# Patient Record
Sex: FEMALE | Race: WHITE | NOT HISPANIC OR LATINO | Employment: FULL TIME | URBAN - METROPOLITAN AREA
[De-identification: names, ages, dates, MRNs, and addresses within clinical notes are randomized per-mention and may not be internally consistent; named-entity substitution may affect disease eponyms.]

---

## 2017-01-31 ENCOUNTER — GENERIC CONVERSION - ENCOUNTER (OUTPATIENT)
Dept: OTHER | Facility: OTHER | Age: 32
End: 2017-01-31

## 2017-11-20 ENCOUNTER — ALLSCRIPTS OFFICE VISIT (OUTPATIENT)
Dept: OTHER | Facility: OTHER | Age: 32
End: 2017-11-20

## 2017-11-20 DIAGNOSIS — Z13.6 ENCOUNTER FOR SCREENING FOR CARDIOVASCULAR DISORDERS: ICD-10-CM

## 2017-11-20 DIAGNOSIS — Z00.00 ENCOUNTER FOR GENERAL ADULT MEDICAL EXAMINATION WITHOUT ABNORMAL FINDINGS: ICD-10-CM

## 2017-11-20 DIAGNOSIS — H92.01 OTALGIA OF RIGHT EAR: ICD-10-CM

## 2017-11-22 NOTE — PROGRESS NOTES
Assessment  1  Encounter for preventive health examination (V70 0) (Z00 00)    Plan  Ear pain, right, Health Maintenance    · (1) CBC/PLT/DIFF; Status:Active; Requested for:20Nov2017;   Screening for cardiovascular condition    · (1) COMPREHENSIVE METABOLIC PANEL; Status:Active; Requested for:20Nov2017;    · (1) LIPID PANEL, FASTING; Status:Active; Requested for:20Nov2017;     Discussion/Summary    Will check screening labs  Continue exercising  Improve diet  Follow up in one year or sooner pending labs  The patient was counseled regarding diagnostic results,-- instructions for management,-- risk factor reductions,-- impressions,-- risks and benefits of treatment options,-- importance of compliance with treatment  Possible side effects of new medications were reviewed with the patient/guardian today  The treatment plan was reviewed with the patient/guardian  The patient/guardian understands and agrees with the treatment plan     Self Referrals: No      Chief Complaint    1  Cold Symptoms   2  Ear Pain  Patient is here with complaints of ear pain  She did have a sore throat, but that has gone away  History of Present Illness  HPI: Follow-upmade this appointment last week when she was having some right ear pain  This has since improved  However she notes she needed to be seen again since she has not been seen since 2015  At this time she is not having any focal issues  did do a pH Q9  Which by numbers is positive however she justified all her answers in that she works in night shift, does not feel she always gets proper rest  This is why she is tired this is also why she answered positive answers to moving slowly, feeling bad that she is not always [employer] Sheets in helping her family and occasionally has little energy  She denies being depressed, no suicidal thoughts, does not feel she needs any medication  has been reviewed, clarified, and updated        Review of Systems   Constitutional: no fever,-- not feeling poorly,-- no chills-- and-- not feeling tired  ENT: earache, but-- as noted in HPI,-- no sore throat-- and-- no nasal discharge  Cardiovascular: no complaints of slow or fast heart rate, no chest pain, no palpitations, no leg claudication or lower extremity edema  Respiratory: no complaints of shortness of breath, no wheezing, no dyspnea on exertion, no orthopnea or PND  Gastrointestinal: no complaints of abdominal pain, no constipation, no nausea or diarrhea, no vomiting, no bloody stools  Genitourinary: no dysuria-- and-- no incontinence  Musculoskeletal: no complaints of arthralgia, no myalgia, no joint swelling or stiffness, no limb pain or swelling  Integumentary: no rashes  Neurological: no complaints of headache, no confusion, no numbness or tingling, no dizziness or fainting  Active Problems  1  Screening for cardiovascular condition (V81 2) (Z13 6)   2  Screening for diabetes mellitus (V77 1) (Z13 1)    Past Medical History  Active Problems And Past Medical History Reviewed: The active problems and past medical history were reviewed and updated today  Surgical History  Surgical History Reviewed: The surgical history was reviewed and updated today  Social History   · Never a smoker   · Occasional alcohol use  The social history was reviewed and updated today  The social history was reviewed and is unchanged  Family History  Family History Reviewed: The family history was reviewed and updated today  Current Meds   1  No Reported Medications Recorded    The medication list was reviewed and updated today  Allergies  1  No Known Drug Allergies    Vitals   Recorded: 20Nov2017 02:58PM   Temperature 97 6 F   Heart Rate 76   Systolic 399   Diastolic 80   Height 5 ft 5 in   Weight 214 lb 6 oz   BMI Calculated 35 67   BSA Calculated 2 04   O2 Saturation 95       Physical Exam   Constitutional  General appearance: No acute distress, well appearing and well nourished     Eyes Conjunctiva and lids: No swelling, erythema or discharge  Pupils and irises: Equal, round and reactive to light  Ears, Nose, Mouth, and Throat  External inspection of ears and nose: Normal    Otoscopic examination: Tympanic membranes translucent with normal light reflex  Canals patent without erythema  Nasal mucosa, septum, and turbinates: Normal without edema or erythema  Oropharynx: Normal with no erythema, edema, exudate or lesions  Pulmonary  Respiratory effort: No increased work of breathing or signs of respiratory distress  Auscultation of lungs: Clear to auscultation  Cardiovascular  Auscultation of heart: Normal rate and rhythm, normal S1 and S2, without murmurs  Examination of extremities for edema and/or varicosities: Normal    Carotid pulses: Normal    Abdomen  Abdomen: Non-tender, no masses  Liver and spleen: No hepatomegaly or splenomegaly  Lymphatic  Palpation of lymph nodes in neck: No lymphadenopathy  Musculoskeletal  Gait and station: Normal    Digits and nails: Normal without clubbing or cyanosis  Inspection/palpation of joints, bones, and muscles: Normal    Skin  Skin and subcutaneous tissue: Normal without rashes or lesions  Neurologic  Cranial nerves: Cranial nerves 2-12 intact  Reflexes: 2+ and symmetric  Sensation: No sensory loss     Psychiatric  Orientation to person, place, and time: Normal    Mood and affect: Normal          Signatures   Electronically signed by : Nazia Tejeda, Vishnu Fernandes; Nov 20 2017  3:26PM EST                       (Author)    Electronically signed by : Manda Watson MD; Nov 21 2017  8:19AM EST

## 2018-01-14 VITALS
HEIGHT: 65 IN | BODY MASS INDEX: 35.72 KG/M2 | DIASTOLIC BLOOD PRESSURE: 80 MMHG | OXYGEN SATURATION: 95 % | WEIGHT: 214.38 LBS | TEMPERATURE: 97.6 F | HEART RATE: 76 BPM | SYSTOLIC BLOOD PRESSURE: 118 MMHG

## 2019-09-04 ENCOUNTER — OFFICE VISIT (OUTPATIENT)
Dept: INTERNAL MEDICINE CLINIC | Facility: CLINIC | Age: 34
End: 2019-09-04
Payer: COMMERCIAL

## 2019-09-04 VITALS
BODY MASS INDEX: 34.23 KG/M2 | SYSTOLIC BLOOD PRESSURE: 112 MMHG | HEART RATE: 60 BPM | WEIGHT: 213 LBS | OXYGEN SATURATION: 98 % | RESPIRATION RATE: 20 BRPM | DIASTOLIC BLOOD PRESSURE: 76 MMHG | HEIGHT: 66 IN

## 2019-09-04 DIAGNOSIS — G56.22 ULNAR NEUROPATHY AT ELBOW, LEFT: ICD-10-CM

## 2019-09-04 DIAGNOSIS — M79.10 MYALGIA: ICD-10-CM

## 2019-09-04 DIAGNOSIS — Z00.00 ANNUAL PHYSICAL EXAM: Primary | ICD-10-CM

## 2019-09-04 DIAGNOSIS — Z13.6 SCREENING FOR HEART DISEASE: ICD-10-CM

## 2019-09-04 DIAGNOSIS — L98.9 SKIN LESIONS: ICD-10-CM

## 2019-09-04 PROCEDURE — 99395 PREV VISIT EST AGE 18-39: CPT | Performed by: PHYSICIAN ASSISTANT

## 2019-09-04 NOTE — PROGRESS NOTES
Assessment/Plan:   Patient Instructions   Patient's general medical exam is good  Will augment exam by doing screening labs  We will contact patient with results when they are available  Will also send to Dermatology due to recurrent skin lesions  Patient should find out the diagnosis of her mother's skin condition  Also patient can contact the [facility] travel clinic to find out if she needs any immunizations for her trip to [facility] in February  Recommend gyn follow-up  BMI Counseling: Body mass index is 34 38 kg/m²  Discussed the patient's BMI with her  The BMI is above average  BMI counseling and education was provided to the patient  Nutrition recommendations include reducing portion sizes, decreasing overall calorie intake and moderation in carbohydrate intake  Exercise recommendations include exercising 3-5 times per week  Return in about 1 year (around 9/4/2020) for Annual physical          Diagnoses and all orders for this visit:    Annual physical exam    Myalgia  -     Vitamin D 25 hydroxy; Future    Skin lesions  -     Ambulatory referral to Dermatology; Future  -     CBC and differential; Future    Screening for heart disease  -     Comprehensive metabolic panel; Future  -     Lipid panel; Future          Subjective:      Patient ID: Es Aguilar is a 34 y o  female  81-year-old white female presents for general medical exam   She has 2 concerns  One is that she has recurrent dry scaly patches at compound on her skin and after a while seem to disappear  This is concerning because her mother started many years ago with similar symptoms, was diagnosed with psoriasis, however her skin condition never responded to any of the medications  According to the patient she was recently seen by Dermatology and a different diagnosis was made, she is not aware of the diagnosis, however her current treatment has cleared her completely    She also will be traveling to [facility] in February and is questioning whether not she will need any immunizations  She was directed to [facility] travel clinic to research this  Patient also describes left ulnar nerve symptoms that come and go  At this current time she is asymptomatic  She was asked to keep us informed if the condition starts becoming more regular  EMR has been reviewed, clarified, and updated with patient today  ALLERGIES:  Allergies   Allergen Reactions    Shellfish-Derived Products        CURRENT MEDICATIONS:  No current outpatient medications on file  ACTIVE PROBLEM LIST:  There is no problem list on file for this patient        PAST MEDICAL HISTORY:  Past Medical History:   Diagnosis Date    Known health problems: none        PAST SURGICAL HISTORY:  Past Surgical History:   Procedure Laterality Date    TOOTH EXTRACTION         FAMILY HISTORY:  Family History   Problem Relation Age of Onset    Diabetes Mother     Thyroid disease Mother     Diabetes Father     Venous thrombosis Maternal Grandfather        SOCIAL HISTORY:  Social History     Socioeconomic History    Marital status: /Civil Union     Spouse name: Not on file    Number of children: Not on file    Years of education: Not on file    Highest education level: Not on file   Occupational History    Not on file   Social Needs    Financial resource strain: Not on file    Food insecurity:     Worry: Not on file     Inability: Not on file    Transportation needs:     Medical: Not on file     Non-medical: Not on file   Tobacco Use    Smoking status: Never Smoker    Smokeless tobacco: Never Used   Substance and Sexual Activity    Alcohol use: Yes     Comment: Angela Foster Drug use: Never    Sexual activity: Yes     Partners: Male   Lifestyle    Physical activity:     Days per week: Not on file     Minutes per session: Not on file    Stress: Not on file   Relationships    Social connections:     Talks on phone: Not on file     Gets together: Not on file Attends Ca[employer] service: Not on file     Active member of club or organization: Not on file     Attends meetings of clubs or organizations: Not on file     Relationship status: Not on file    Intimate partner violence:     Fear of current or ex partner: Not on file     Emotionally abused: Not on file     Physically abused: Not on file     Forced sexual activity: Not on file   Other Topics Concern    Not on file   Social History Narrative        1 child    Working-[employer] nights    Hobbies-drawing    Exercise-running    Reg dental care, brush twice daily       Review of Systems   Constitutional: Negative for activity change, chills, fatigue and fever  HENT: Negative for congestion  Eyes: Negative for discharge and visual disturbance  Respiratory: Negative for cough, chest tightness and shortness of breath  Cardiovascular: Negative for chest pain, palpitations and leg swelling  Gastrointestinal: Negative for abdominal pain, blood in stool, constipation, diarrhea, nausea and vomiting  Endocrine: Negative for polydipsia, polyphagia and polyuria  Genitourinary: Negative for difficulty urinating  Musculoskeletal: Negative for arthralgias and myalgias  Skin: Negative for rash  Allergic/Immunologic: Negative for immunocompromised state  Neurological: Negative for dizziness, syncope, weakness, light-headedness and headaches  Hematological: Negative for adenopathy  Does not bruise/bleed easily  Psychiatric/Behavioral: Negative for dysphoric mood, sleep disturbance and suicidal ideas  The patient is not nervous/anxious  Objective:  Vitals:    09/04/19 1205   BP: 112/76   BP Location: Left arm   Patient Position: Sitting   Cuff Size: Large   Pulse: 60   Resp: 20   SpO2: 98%   Weight: 96 6 kg (213 lb)   Height: 5' 6" (1 676 m)     Body mass index is 34 38 kg/m²  Physical Exam   Constitutional: She is oriented to person, place, and time   She appears well-developed and well-nourished  No distress  Obese   HENT:   Head: Normocephalic  Right Ear: External ear normal    Left Ear: External ear normal    Nose: Nose normal    Mouth/Throat: Oropharynx is clear and moist  No oropharyngeal exudate  Eyes: Pupils are equal, round, and reactive to light  Conjunctivae and EOM are normal  Right eye exhibits no discharge  Left eye exhibits no discharge  No scleral icterus  Neck: Normal range of motion  Neck supple  No JVD present  Carotid bruit is not present  No tracheal deviation present  No thyromegaly present  Cardiovascular: Normal rate, regular rhythm, normal heart sounds and intact distal pulses  Exam reveals no gallop and no friction rub  No murmur heard  Pulmonary/Chest: Effort normal and breath sounds normal  No respiratory distress  She has no wheezes  She has no rales  She exhibits no tenderness  Abdominal: Soft  Bowel sounds are normal  She exhibits no distension and no mass  There is no hepatosplenomegaly  There is no tenderness  There is no rebound, no guarding and no CVA tenderness  Obese   Musculoskeletal: Normal range of motion  She exhibits no edema, tenderness or deformity  Lymphadenopathy:     She has no cervical adenopathy  Neurological: She is alert and oriented to person, place, and time  She has normal reflexes  She displays normal reflexes  No cranial nerve deficit or sensory deficit  She exhibits normal muscle tone  Coordination normal    Skin: Skin is warm and dry  No rash noted  Psychiatric: She has a normal mood and affect  Her behavior is normal  Judgment and thought content normal    Nursing note and vitals reviewed  RESULTS:    No results found for this or any previous visit (from the past 1008 hour(s))  This note was created with voice recognition software  Phonic, grammatical and spelling errors may be present within the note as a result

## 2019-09-04 NOTE — PATIENT INSTRUCTIONS
Patient's general medical exam is good  Will augment exam by doing screening labs  We will contact patient with results when they are available  Will also send to Dermatology due to recurrent skin lesions  Patient should find out the diagnosis of her mother's skin condition  Also patient can contact the [facility] travel clinic to find out if she needs any immunizations for her trip to [facility] in February

## 2019-09-05 ENCOUNTER — APPOINTMENT (OUTPATIENT)
Dept: LAB | Facility: CLINIC | Age: 34
End: 2019-09-05
Payer: COMMERCIAL

## 2019-09-05 DIAGNOSIS — L98.9 SKIN LESIONS: ICD-10-CM

## 2019-09-05 DIAGNOSIS — Z13.6 SCREENING FOR HEART DISEASE: ICD-10-CM

## 2019-09-05 DIAGNOSIS — M79.10 MYALGIA: ICD-10-CM

## 2019-09-05 LAB
25(OH)D3 SERPL-MCNC: 20.9 NG/ML (ref 30–100)
ALBUMIN SERPL BCP-MCNC: 3.8 G/DL (ref 3.5–5)
ALP SERPL-CCNC: 65 U/L (ref 46–116)
ALT SERPL W P-5'-P-CCNC: 52 U/L (ref 12–78)
ANION GAP SERPL CALCULATED.3IONS-SCNC: 7 MMOL/L (ref 4–13)
AST SERPL W P-5'-P-CCNC: 20 U/L (ref 5–45)
BASOPHILS # BLD AUTO: 0.05 THOUSANDS/ΜL (ref 0–0.1)
BASOPHILS NFR BLD AUTO: 1 % (ref 0–1)
BILIRUB SERPL-MCNC: 0.6 MG/DL (ref 0.2–1)
BUN SERPL-MCNC: 10 MG/DL (ref 5–25)
CALCIUM SERPL-MCNC: 9.2 MG/DL (ref 8.3–10.1)
CHLORIDE SERPL-SCNC: 105 MMOL/L (ref 100–108)
CHOLEST SERPL-MCNC: 204 MG/DL (ref 50–200)
CO2 SERPL-SCNC: 27 MMOL/L (ref 21–32)
CREAT SERPL-MCNC: 0.8 MG/DL (ref 0.6–1.3)
EOSINOPHIL # BLD AUTO: 0.21 THOUSAND/ΜL (ref 0–0.61)
EOSINOPHIL NFR BLD AUTO: 3 % (ref 0–6)
ERYTHROCYTE [DISTWIDTH] IN BLOOD BY AUTOMATED COUNT: 12.5 % (ref 11.6–15.1)
GFR SERPL CREATININE-BSD FRML MDRD: 99 ML/MIN/1.73SQ M
GLUCOSE P FAST SERPL-MCNC: 193 MG/DL (ref 65–99)
HCT VFR BLD AUTO: 43.1 % (ref 34.8–46.1)
HDLC SERPL-MCNC: 39 MG/DL (ref 40–60)
HGB BLD-MCNC: 14.4 G/DL (ref 11.5–15.4)
IMM GRANULOCYTES # BLD AUTO: 0.03 THOUSAND/UL (ref 0–0.2)
IMM GRANULOCYTES NFR BLD AUTO: 0 % (ref 0–2)
LDLC SERPL CALC-MCNC: 134 MG/DL (ref 0–100)
LYMPHOCYTES # BLD AUTO: 2.24 THOUSANDS/ΜL (ref 0.6–4.47)
LYMPHOCYTES NFR BLD AUTO: 32 % (ref 14–44)
MCH RBC QN AUTO: 29.9 PG (ref 26.8–34.3)
MCHC RBC AUTO-ENTMCNC: 33.4 G/DL (ref 31.4–37.4)
MCV RBC AUTO: 89 FL (ref 82–98)
MONOCYTES # BLD AUTO: 0.38 THOUSAND/ΜL (ref 0.17–1.22)
MONOCYTES NFR BLD AUTO: 5 % (ref 4–12)
NEUTROPHILS # BLD AUTO: 4.11 THOUSANDS/ΜL (ref 1.85–7.62)
NEUTS SEG NFR BLD AUTO: 59 % (ref 43–75)
NONHDLC SERPL-MCNC: 165 MG/DL
NRBC BLD AUTO-RTO: 0 /100 WBCS
PLATELET # BLD AUTO: 206 THOUSANDS/UL (ref 149–390)
PMV BLD AUTO: 10.6 FL (ref 8.9–12.7)
POTASSIUM SERPL-SCNC: 4.1 MMOL/L (ref 3.5–5.3)
PROT SERPL-MCNC: 7.4 G/DL (ref 6.4–8.2)
RBC # BLD AUTO: 4.82 MILLION/UL (ref 3.81–5.12)
SODIUM SERPL-SCNC: 139 MMOL/L (ref 136–145)
TRIGL SERPL-MCNC: 154 MG/DL
WBC # BLD AUTO: 7.02 THOUSAND/UL (ref 4.31–10.16)

## 2019-09-05 PROCEDURE — 85025 COMPLETE CBC W/AUTO DIFF WBC: CPT

## 2019-09-05 PROCEDURE — 80053 COMPREHEN METABOLIC PANEL: CPT

## 2019-09-05 PROCEDURE — 80061 LIPID PANEL: CPT

## 2019-09-05 PROCEDURE — 36415 COLL VENOUS BLD VENIPUNCTURE: CPT

## 2019-09-05 PROCEDURE — 82306 VITAMIN D 25 HYDROXY: CPT

## 2019-12-12 ENCOUNTER — OFFICE VISIT (OUTPATIENT)
Dept: DERMATOLOGY | Facility: CLINIC | Age: 34
End: 2019-12-12
Payer: COMMERCIAL

## 2019-12-12 DIAGNOSIS — Z13.89 SCREENING FOR SKIN CONDITION: ICD-10-CM

## 2019-12-12 DIAGNOSIS — L98.9 SKIN LESIONS: ICD-10-CM

## 2019-12-12 DIAGNOSIS — L20.9 ATOPIC DERMATITIS, UNSPECIFIED TYPE: Primary | ICD-10-CM

## 2019-12-12 PROCEDURE — 99203 OFFICE O/P NEW LOW 30 MIN: CPT | Performed by: DERMATOLOGY

## 2019-12-12 NOTE — PROGRESS NOTES
[facility] DERMATOLOGY  [address] [place] [id number]  [phone]  [phone]     MRN: [id number] : 1985  Encounter: [id number]  Patient Information: Mahendra Perez  Chief complaint:  Skin rash    History of present illness:  72-year-old female presents for concerns regarding a rash that has been a problem for 3 years  Patient notes that this occurred after the birth of her child patient also notes that her mother had had a severe type rash that developed after her birth and she is concerned that potential that she is getting the same problem  Her mother's diagnosis has been a call psoriasis as well as eczema in the past was not real clear of for what the diagnosis was however it all seem to have cleared after a allergic reaction  Past Medical History:   Diagnosis Date    Known health problems: none      Past Surgical History:   Procedure Laterality Date    TOOTH EXTRACTION       Social History   Social History     Substance and Sexual Activity   Alcohol Use Yes    Comment: Occ-rare     Social History     Substance and Sexual Activity   Drug Use Never     Social History     Tobacco Use   Smoking Status Never Smoker   Smokeless Tobacco Never Used     Family History   Problem Relation Age of Onset    Diabetes Mother     Thyroid disease Mother     Diabetes Father     Venous thrombosis Maternal Grandfather      Meds/Allergies   Allergies   Allergen Reactions    Shellfish-Derived Products        Meds:  Prior to Admission medications    Medication Sig Start Date End Date Taking?  Authorizing Provider   levonorgestrel (MIRENA) 20 MCG/24HR IUD 1 each by Intrauterine route   Yes Historical Provider, MD       Subjective:     Review of Systems:    General: negative for - chills, fatigue, fever,  weight gain or weight loss  Psychological: negative for - anxiety, behavioral disorder, concentration difficulties, decreased libido, depression, irritability, memory difficulties, mood swings, sleep disturbances or suicidal ideation  ENT: negative for - hearing difficulties , nasal congestion, nasal discharge, oral lesions, sinus pain, sneezing, sore throat  Allergy and Immunology: negative for - hives, insect bite sensitivity,  Hematological and Lymphatic: negative for - bleeding problems, blood clots,bruising, swollen lymph nodes  Endocrine: negative for - hair pattern changes, hot flashes, malaise/lethargy, mood swings, palpitations, polydipsia/polyuria, skin changes, temperature intolerance or unexpected weight change  Respiratory: negative for - cough, hemoptysis, orthopnea, shortness of breath, or wheezing  Cardiovascular: negative for - chest pain, dyspnea on exertion, edema,  Gastrointestinal: negative for - abdominal pain, nausea/vomiting  Genito-Urinary: negative for - dysuria, incontinence, irregular/heavy menses or urinary frequency/urgency  Musculoskeletal: negative for - gait disturbance, joint pain, joint stiffness, joint swelling, muscle pain, muscular weakness  Dermatological:  As in HPI  Neurological: negative for confusion, dizziness, headaches, impaired coordination/balance, memory loss, numbness/tingling, seizures, speech problems, tremors or weakness       Objective: There were no vitals taken for this visit  Physical Exam:    General Appearance:    Alert, cooperative, no distress   Head:    Normocephalic, without obvious abnormality, atraumatic           Skin:   A full skin exam was performed including scalp, head scalp, eyes, ears, nose, lips, neck, chest, axilla, abdomen, back, buttocks, bilateral upper extremities, bilateral lower extremities, hands, feet, fingers, toes, fingernails, and toenails scaling diffusely present on the arms little bit on the legs no other rash noted on the left knee that is more for a defined patch rest of it is not really defined enough to be concerned with psoriasis     Assessment:     1   Atopic dermatitis, unspecified type     2  Skin lesions  Ambulatory referral to Dermatology   3  Screening for skin condition           Plan:   Atopic dermatitis probably irritant related we suggested use of mild soap like Dove for sensitive skin moisturizing cream such as Cetaphil or  Cereva  Also discussed the concept of the humidifier and use of to tepid water for bathing  No other treatment necessary at this time not sure if the patch on the left knee is consistent with psoriasis but at this point would not even give her that diagnosis unless further lesions are noted  Screening for Dermatologic Disorders: Nothing else of concern noted on complete exam follow up in 1 year         Erin Romero MD  12/12/2019,9:42 AM    Portions of the record may have been created with voice recognition software   Occasional wrong word or "sound a like" substitutions may have occurred due to the inherent limitations of voice recognition software   Read the chart carefully and recognize, using context, where substitutions have occurred

## 2019-12-12 NOTE — PATIENT INSTRUCTIONS
Atopic dermatitis probably irritant related we suggested use of mild soap like Dove for sensitive skin moisturizing cream such as Cetaphil or  Cereva  Also discussed the concept of the humidifier and use of to tepid water for bathing    No other treatment necessary at this time not sure if the patch on the left knee is consistent with psoriasis but at this point would not even give her that diagnosis unless further lesions are noted  Screening for Dermatologic Disorders: Nothing else of concern noted on complete exam follow up in 1 year

## 2020-03-04 ENCOUNTER — TELEPHONE (OUTPATIENT)
Dept: INTERNAL MEDICINE CLINIC | Facility: CLINIC | Age: 35
End: 2020-03-04

## 2020-03-04 NOTE — TELEPHONE ENCOUNTER
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

## 2020-09-16 ENCOUNTER — OFFICE VISIT (OUTPATIENT)
Dept: INTERNAL MEDICINE CLINIC | Facility: CLINIC | Age: 35
End: 2020-09-16
Payer: COMMERCIAL

## 2020-09-16 VITALS
OXYGEN SATURATION: 98 % | WEIGHT: 219 LBS | TEMPERATURE: 97.8 F | BODY MASS INDEX: 35.2 KG/M2 | HEIGHT: 66 IN | SYSTOLIC BLOOD PRESSURE: 122 MMHG | HEART RATE: 97 BPM | DIASTOLIC BLOOD PRESSURE: 70 MMHG

## 2020-09-16 DIAGNOSIS — E78.2 MIXED HYPERLIPIDEMIA: ICD-10-CM

## 2020-09-16 DIAGNOSIS — E66.9 OBESITY (BMI 35.0-39.9 WITHOUT COMORBIDITY): ICD-10-CM

## 2020-09-16 DIAGNOSIS — Z11.4 SCREENING FOR HIV (HUMAN IMMUNODEFICIENCY VIRUS): ICD-10-CM

## 2020-09-16 DIAGNOSIS — Z00.00 ANNUAL PHYSICAL EXAM: Primary | ICD-10-CM

## 2020-09-16 DIAGNOSIS — R73.01 IMPAIRED FASTING GLUCOSE: ICD-10-CM

## 2020-09-16 DIAGNOSIS — K21.9 GASTROESOPHAGEAL REFLUX DISEASE, ESOPHAGITIS PRESENCE NOT SPECIFIED: ICD-10-CM

## 2020-09-16 PROCEDURE — 99395 PREV VISIT EST AGE 18-39: CPT | Performed by: PHYSICIAN ASSISTANT

## 2020-09-16 NOTE — PATIENT INSTRUCTIONS
General exam is acceptable  Have your labs done, we will discuss the results when available  Schedule follow-up for 1 year for repeat physical   For ongoing heartburn you can try OTC Pepcid (famotidine) 20 mg twice a day for the next month  If heartburn is persistent please contact me

## 2020-09-16 NOTE — PROGRESS NOTES
Assessment/Plan:      Patient Instructions   General exam is acceptable  Have your labs done, we will discuss the results when available  Schedule follow-up for 1 year for repeat physical   For ongoing heartburn you can try OTC Pepcid (famotidine) 20 mg twice a day for the next month  If heartburn is persistent please contact me  Have also advised patient to lose weight by proper dieting and exercise  Quality Measures: BMI Counseling: Body mass index is 35 35 kg/m²  The BMI is above normal  Nutrition recommendations include decreasing portion sizes, encouraging healthy choices of fruits and vegetables and moderation in carbohydrate intake  Exercise recommendations include exercising 3-5 times per week  Return in about 1 year (around 9/16/2021) for Annual physical          Diagnoses and all orders for this visit:    Annual physical exam    Impaired fasting glucose  -     Hemoglobin A1C; Future  -     CBC and differential; Future  -     Comprehensive metabolic panel; Future    Mixed hyperlipidemia  -     Lipid panel; Future    Gastroesophageal reflux disease, esophagitis presence not specified    Screening for HIV (human immunodeficiency virus)  -     HIV 1/2 Antigen/Antibody (4th Generation) w Reflex [facility]; Future    Other orders  -     Probiotic Product (PROBIOTIC PO); Take by mouth  -     Multiple Vitamin (MULTIVITAMIN PO); Take by mouth          Subjective:      Patient ID: Tessa Gordon is a 30 y o  female  82-year-old white female presents for her annual physical   Overall she states she feels well  Recently with the pandemic she has gained weight  She has gained approximately 6 lb since last year  She also notes that she has started experiencing reflux symptoms  She notes she has increased her intake of Tea, and notes that she does eat within 2 hours of lying down  She is a nonsmoker, denies other GERD triggers  No other focal concerns today  HIV screening discussed    Patient in agreement  EMR has been reviewed, clarified, and updated with patient today        ALLERGIES:  Allergies   Allergen Reactions    Shellfish-Derived Products        CURRENT MEDICATIONS:    Current Outpatient Medications:     levonorgestrel (MIRENA) 20 MCG/24HR IUD, 1 each by Intrauterine route, Disp: , Rfl:     Multiple Vitamin (MULTIVITAMIN PO), Take by mouth, Disp: , Rfl:     Probiotic Product (PROBIOTIC PO), Take by mouth, Disp: , Rfl:     ACTIVE PROBLEM LIST:  Patient Active Problem List   Diagnosis    Ulnar neuropathy at elbow, left    Mixed hyperlipidemia    Impaired fasting glucose    Gastroesophageal reflux disease       PAST MEDICAL HISTORY:  Past Medical History:   Diagnosis Date    Known health problems: none        PAST SURGICAL HISTORY:  Past Surgical History:   Procedure Laterality Date    TOOTH EXTRACTION         FAMILY HISTORY:  Family History   Problem Relation Age of Onset    Diabetes Mother     Thyroid disease Mother     Diabetes Father     Venous thrombosis Maternal Grandfather        SOCIAL HISTORY:  Social History     Socioeconomic History    Marital status: /Civil Union     Spouse name: Not on file    Number of children: Not on file    Years of education: Not on file    Highest education level: Not on file   Occupational History    Not on file   Social Needs    Financial resource strain: Not on file    Food insecurity     Worry: Not on file     Inability: Not on file    Transportation needs     Medical: Not on file     Non-medical: Not on file   Tobacco Use    Smoking status: Never Smoker    Smokeless tobacco: Never Used   Substance and Sexual Activity    Alcohol use: Yes     Comment: Beatris Lozada Drug use: Never    Sexual activity: Yes     Partners: Male   Lifestyle    Physical activity     Days per week: Not on file     Minutes per session: Not on file    Stress: Not on file   Relationships    Social connections     Talks on phone: Not on file     Gets together: Not on file     Attends Advent service: Not on file     Active member of club or organization: Not on file     Attends meetings of clubs or organizations: Not on file     Relationship status: Not on file    Intimate partner violence     Fear of current or ex partner: Not on file     Emotionally abused: Not on file     Physically abused: Not on file     Forced sexual activity: Not on file   Other Topics Concern    Not on file   Social History Narrative        1 child    Working-[employer] nights    Hobbies-drawing    Exercise-running, biking    Reg dental care, brush twice daily       Review of Systems   Constitutional: Negative for activity change, chills, fatigue and fever  HENT: Negative for congestion  Eyes: Negative for discharge and visual disturbance  Respiratory: Negative for cough, chest tightness and shortness of breath  Cardiovascular: Negative for chest pain, palpitations and leg swelling  Gastrointestinal: Negative for abdominal pain, blood in stool, constipation, diarrhea, nausea and vomiting  Endocrine: Negative for polydipsia, polyphagia and polyuria  Genitourinary: Negative for difficulty urinating  Musculoskeletal: Negative for arthralgias and myalgias  Skin: Negative for rash  Allergic/Immunologic: Negative for immunocompromised state  Neurological: Negative for dizziness, syncope, weakness, light-headedness and headaches  Hematological: Negative for adenopathy  Does not bruise/bleed easily  Psychiatric/Behavioral: Positive for sleep disturbance (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)  Negative for dysphoric mood and suicidal ideas  The patient is not nervous/anxious            Objective:  Vitals:    09/16/20 1258   BP: 122/70   BP Location: Right arm   Patient Position: Sitting   Cuff Size: Adult   Pulse: 97   Temp: 97 8 °F (36 6 °C)   TempSrc: Temporal   SpO2: 98%   Weight: 99 3 kg (219 lb)   Height: 5' 6" (1 676 m)     Body mass index is 35 35 kg/m²  Physical Exam  Vitals signs and nursing note reviewed  Constitutional:       General: She is not in acute distress  Appearance: She is well-developed  She is obese  She is not ill-appearing  HENT:      Head: Normocephalic and atraumatic  Right Ear: Tympanic membrane, ear canal and external ear normal       Left Ear: Tympanic membrane, ear canal and external ear normal       Nose: Nose normal       Mouth/Throat:      Mouth: Mucous membranes are moist       Pharynx: Oropharynx is clear  Eyes:      General: No scleral icterus  Right eye: No discharge  Left eye: No discharge  Conjunctiva/sclera: Conjunctivae normal       Pupils: Pupils are equal, round, and reactive to light  Neck:      Musculoskeletal: Normal range of motion and neck supple  Thyroid: No thyromegaly  Vascular: No carotid bruit  Cardiovascular:      Rate and Rhythm: Normal rate and regular rhythm  Pulses: Normal pulses  Heart sounds: Normal heart sounds  No murmur  Pulmonary:      Effort: Pulmonary effort is normal  No respiratory distress  Breath sounds: Normal breath sounds  Chest:      Chest wall: No tenderness  Abdominal:      General: Abdomen is flat  Bowel sounds are normal  There is no distension  Palpations: Abdomen is soft  There is no hepatomegaly, splenomegaly or mass  Tenderness: There is no abdominal tenderness  There is no right CVA tenderness, left CVA tenderness, guarding or rebound  Musculoskeletal: Normal range of motion  General: No swelling  Right lower leg: No edema  Left lower leg: No edema  Lymphadenopathy:      Cervical: No cervical adenopathy  Skin:     General: Skin is warm and dry  Findings: No rash  Neurological:      General: No focal deficit present  Mental Status: She is alert and oriented to person, place, and time  Cranial Nerves: No cranial nerve deficit        Sensory: No sensory deficit  Motor: No weakness  Coordination: Coordination normal       Gait: Gait normal       Deep Tendon Reflexes: Reflexes are normal and symmetric  Reflexes normal    Psychiatric:         Mood and Affect: Mood normal          Behavior: Behavior normal          Thought Content: Thought content normal          Judgment: Judgment normal            RESULTS:    No results found for this or any previous visit (from the past 1008 hour(s))  This note was created with voice recognition software  Phonic, grammatical and spelling errors may be present within the note as a result

## 2020-09-30 ENCOUNTER — APPOINTMENT (OUTPATIENT)
Dept: LAB | Facility: CLINIC | Age: 35
End: 2020-09-30
Payer: COMMERCIAL

## 2020-09-30 DIAGNOSIS — Z11.4 SCREENING FOR HIV (HUMAN IMMUNODEFICIENCY VIRUS): ICD-10-CM

## 2020-09-30 DIAGNOSIS — R73.01 IMPAIRED FASTING GLUCOSE: ICD-10-CM

## 2020-09-30 DIAGNOSIS — E78.2 MIXED HYPERLIPIDEMIA: ICD-10-CM

## 2020-09-30 LAB
ALBUMIN SERPL BCP-MCNC: 3.8 G/DL (ref 3.5–5)
ALP SERPL-CCNC: 72 U/L (ref 46–116)
ALT SERPL W P-5'-P-CCNC: 67 U/L (ref 12–78)
ANION GAP SERPL CALCULATED.3IONS-SCNC: 5 MMOL/L (ref 4–13)
AST SERPL W P-5'-P-CCNC: 28 U/L (ref 5–45)
BASOPHILS # BLD AUTO: 0.05 THOUSANDS/ΜL (ref 0–0.1)
BASOPHILS NFR BLD AUTO: 1 % (ref 0–1)
BILIRUB SERPL-MCNC: 0.55 MG/DL (ref 0.2–1)
BUN SERPL-MCNC: 15 MG/DL (ref 5–25)
CALCIUM SERPL-MCNC: 9.1 MG/DL (ref 8.3–10.1)
CHLORIDE SERPL-SCNC: 104 MMOL/L (ref 100–108)
CHOLEST SERPL-MCNC: 220 MG/DL (ref 50–200)
CO2 SERPL-SCNC: 29 MMOL/L (ref 21–32)
CREAT SERPL-MCNC: 0.88 MG/DL (ref 0.6–1.3)
EOSINOPHIL # BLD AUTO: 0.17 THOUSAND/ΜL (ref 0–0.61)
EOSINOPHIL NFR BLD AUTO: 2 % (ref 0–6)
ERYTHROCYTE [DISTWIDTH] IN BLOOD BY AUTOMATED COUNT: 12 % (ref 11.6–15.1)
EST. AVERAGE GLUCOSE BLD GHB EST-MCNC: 252 MG/DL
GFR SERPL CREATININE-BSD FRML MDRD: 87 ML/MIN/1.73SQ M
GLUCOSE P FAST SERPL-MCNC: 258 MG/DL (ref 65–99)
HBA1C MFR BLD: 10.4 %
HCT VFR BLD AUTO: 43.7 % (ref 34.8–46.1)
HDLC SERPL-MCNC: 46 MG/DL
HGB BLD-MCNC: 15.1 G/DL (ref 11.5–15.4)
IMM GRANULOCYTES # BLD AUTO: 0.02 THOUSAND/UL (ref 0–0.2)
IMM GRANULOCYTES NFR BLD AUTO: 0 % (ref 0–2)
LDLC SERPL CALC-MCNC: 134 MG/DL (ref 0–100)
LYMPHOCYTES # BLD AUTO: 2.51 THOUSANDS/ΜL (ref 0.6–4.47)
LYMPHOCYTES NFR BLD AUTO: 33 % (ref 14–44)
MCH RBC QN AUTO: 29.8 PG (ref 26.8–34.3)
MCHC RBC AUTO-ENTMCNC: 34.6 G/DL (ref 31.4–37.4)
MCV RBC AUTO: 86 FL (ref 82–98)
MONOCYTES # BLD AUTO: 0.49 THOUSAND/ΜL (ref 0.17–1.22)
MONOCYTES NFR BLD AUTO: 6 % (ref 4–12)
NEUTROPHILS # BLD AUTO: 4.4 THOUSANDS/ΜL (ref 1.85–7.62)
NEUTS SEG NFR BLD AUTO: 58 % (ref 43–75)
NONHDLC SERPL-MCNC: 174 MG/DL
NRBC BLD AUTO-RTO: 0 /100 WBCS
PLATELET # BLD AUTO: 221 THOUSANDS/UL (ref 149–390)
PMV BLD AUTO: 9.8 FL (ref 8.9–12.7)
POTASSIUM SERPL-SCNC: 4 MMOL/L (ref 3.5–5.3)
PROT SERPL-MCNC: 7.5 G/DL (ref 6.4–8.2)
RBC # BLD AUTO: 5.07 MILLION/UL (ref 3.81–5.12)
SODIUM SERPL-SCNC: 138 MMOL/L (ref 136–145)
TRIGL SERPL-MCNC: 198 MG/DL
WBC # BLD AUTO: 7.64 THOUSAND/UL (ref 4.31–10.16)

## 2020-09-30 PROCEDURE — 87389 HIV-1 AG W/HIV-1&-2 AB AG IA: CPT

## 2020-09-30 PROCEDURE — 36415 COLL VENOUS BLD VENIPUNCTURE: CPT

## 2020-09-30 PROCEDURE — 80061 LIPID PANEL: CPT

## 2020-09-30 PROCEDURE — 80053 COMPREHEN METABOLIC PANEL: CPT

## 2020-09-30 PROCEDURE — 85025 COMPLETE CBC W/AUTO DIFF WBC: CPT

## 2020-09-30 PROCEDURE — 83036 HEMOGLOBIN GLYCOSYLATED A1C: CPT

## 2020-10-01 LAB — HIV 1+2 AB+HIV1 P24 AG SERPL QL IA: NORMAL

## 2020-10-08 PROBLEM — E11.65 UNCONTROLLED TYPE 2 DIABETES MELLITUS WITH HYPERGLYCEMIA (HCC): Status: ACTIVE | Noted: 2020-10-08

## 2021-04-06 ENCOUNTER — TELEPHONE (OUTPATIENT)
Dept: ADMINISTRATIVE | Facility: OTHER | Age: 36
End: 2021-04-06

## 2021-04-06 NOTE — TELEPHONE ENCOUNTER
Upon review of the In Basket request we were able to locate, review, and update the patient chart as requested for Pap Smear (HPV) aka Cervical Cancer Screening  Any additional questions or concerns should be emailed to the Practice Liaisons via [email or web address] email, please do not reply via In Basket      Thank you  Junie Litten, MA

## 2021-04-06 NOTE — TELEPHONE ENCOUNTER
----- Message from Elliot Alex sent at 4/6/2021 10:58 AM EDT -----  Regarding: Pap Smear  04/06/21 10:58 AM    Hello, our patient Arianna Khan has had Pap Smear (HPV) aka Cervical Cancer Screening completed/performed  Please assist in updating the patient chart by pulling the Care Everywhere (CE) document  The date of service is 1/31/2017       Thank you,  Elliot Alex  [facility]

## 2021-04-16 ENCOUNTER — IMMUNIZATIONS (OUTPATIENT)
Dept: FAMILY MEDICINE CLINIC | Facility: HOSPITAL | Age: 36
End: 2021-04-16

## 2021-04-16 DIAGNOSIS — Z23 ENCOUNTER FOR IMMUNIZATION: Primary | ICD-10-CM

## 2021-04-16 PROCEDURE — 0001A SARS-COV-2 / COVID-19 MRNA VACCINE (PFIZER-BIONTECH) 30 MCG: CPT

## 2021-04-16 PROCEDURE — 91300 SARS-COV-2 / COVID-19 MRNA VACCINE (PFIZER-BIONTECH) 30 MCG: CPT

## 2021-05-08 ENCOUNTER — IMMUNIZATIONS (OUTPATIENT)
Dept: FAMILY MEDICINE CLINIC | Facility: HOSPITAL | Age: 36
End: 2021-05-08

## 2021-05-08 DIAGNOSIS — Z23 ENCOUNTER FOR IMMUNIZATION: Primary | ICD-10-CM

## 2021-05-08 PROCEDURE — 0002A SARS-COV-2 / COVID-19 MRNA VACCINE (PFIZER-BIONTECH) 30 MCG: CPT

## 2021-05-08 PROCEDURE — 91300 SARS-COV-2 / COVID-19 MRNA VACCINE (PFIZER-BIONTECH) 30 MCG: CPT

## 2021-12-09 ENCOUNTER — OFFICE VISIT (OUTPATIENT)
Dept: INTERNAL MEDICINE CLINIC | Facility: CLINIC | Age: 36
End: 2021-12-09
Payer: COMMERCIAL

## 2021-12-09 ENCOUNTER — APPOINTMENT (OUTPATIENT)
Dept: LAB | Facility: HOSPITAL | Age: 36
End: 2021-12-09
Payer: COMMERCIAL

## 2021-12-09 VITALS
BODY MASS INDEX: 32.47 KG/M2 | DIASTOLIC BLOOD PRESSURE: 88 MMHG | SYSTOLIC BLOOD PRESSURE: 126 MMHG | TEMPERATURE: 98.1 F | RESPIRATION RATE: 16 BRPM | OXYGEN SATURATION: 97 % | WEIGHT: 202 LBS | HEART RATE: 85 BPM | HEIGHT: 66 IN

## 2021-12-09 DIAGNOSIS — K21.9 GASTROESOPHAGEAL REFLUX DISEASE, UNSPECIFIED WHETHER ESOPHAGITIS PRESENT: ICD-10-CM

## 2021-12-09 DIAGNOSIS — E66.9 OBESITY (BMI 35.0-39.9 WITHOUT COMORBIDITY): ICD-10-CM

## 2021-12-09 DIAGNOSIS — Z00.00 ANNUAL PHYSICAL EXAM: Primary | ICD-10-CM

## 2021-12-09 DIAGNOSIS — E78.2 MIXED HYPERLIPIDEMIA: ICD-10-CM

## 2021-12-09 DIAGNOSIS — E11.65 UNCONTROLLED TYPE 2 DIABETES MELLITUS WITH HYPERGLYCEMIA (HCC): ICD-10-CM

## 2021-12-09 DIAGNOSIS — M79.10 MYALGIA: ICD-10-CM

## 2021-12-09 DIAGNOSIS — Z11.59 NEED FOR HEPATITIS C SCREENING TEST: ICD-10-CM

## 2021-12-09 DIAGNOSIS — R53.83 FATIGUE, UNSPECIFIED TYPE: ICD-10-CM

## 2021-12-09 LAB
25(OH)D3 SERPL-MCNC: 17.5 NG/ML (ref 30–100)
ALBUMIN SERPL BCP-MCNC: 3.6 G/DL (ref 3.5–5)
ALP SERPL-CCNC: 66 U/L (ref 46–116)
ALT SERPL W P-5'-P-CCNC: 66 U/L (ref 12–78)
ANION GAP SERPL CALCULATED.3IONS-SCNC: 12 MMOL/L (ref 4–13)
AST SERPL W P-5'-P-CCNC: 26 U/L (ref 5–45)
BASOPHILS # BLD AUTO: 0.04 THOUSANDS/ΜL (ref 0–0.1)
BASOPHILS NFR BLD AUTO: 1 % (ref 0–1)
BILIRUB SERPL-MCNC: 0.9 MG/DL (ref 0.2–1)
BUN SERPL-MCNC: 12 MG/DL (ref 5–25)
CALCIUM SERPL-MCNC: 8.4 MG/DL (ref 8.3–10.1)
CHLORIDE SERPL-SCNC: 100 MMOL/L (ref 100–108)
CHOLEST SERPL-MCNC: 208 MG/DL
CO2 SERPL-SCNC: 26 MMOL/L (ref 21–32)
CREAT SERPL-MCNC: 0.76 MG/DL (ref 0.6–1.3)
CREAT UR-MCNC: 272 MG/DL
EOSINOPHIL # BLD AUTO: 0.16 THOUSAND/ΜL (ref 0–0.61)
EOSINOPHIL NFR BLD AUTO: 2 % (ref 0–6)
ERYTHROCYTE [DISTWIDTH] IN BLOOD BY AUTOMATED COUNT: 11.8 % (ref 11.6–15.1)
EST. AVERAGE GLUCOSE BLD GHB EST-MCNC: 255 MG/DL
GFR SERPL CREATININE-BSD FRML MDRD: 103 ML/MIN/1.73SQ M
GLUCOSE P FAST SERPL-MCNC: 218 MG/DL (ref 65–99)
HBA1C MFR BLD: 10.5 %
HCT VFR BLD AUTO: 46.2 % (ref 34.8–46.1)
HCV AB SER QL: NORMAL
HDLC SERPL-MCNC: 47 MG/DL
HGB BLD-MCNC: 15.8 G/DL (ref 11.5–15.4)
IMM GRANULOCYTES # BLD AUTO: 0.02 THOUSAND/UL (ref 0–0.2)
IMM GRANULOCYTES NFR BLD AUTO: 0 % (ref 0–2)
LDLC SERPL CALC-MCNC: 132 MG/DL (ref 0–100)
LYMPHOCYTES # BLD AUTO: 2.58 THOUSANDS/ΜL (ref 0.6–4.47)
LYMPHOCYTES NFR BLD AUTO: 33 % (ref 14–44)
MCH RBC QN AUTO: 29.3 PG (ref 26.8–34.3)
MCHC RBC AUTO-ENTMCNC: 34.2 G/DL (ref 31.4–37.4)
MCV RBC AUTO: 86 FL (ref 82–98)
MICROALBUMIN UR-MCNC: 30.9 MG/L (ref 0–20)
MICROALBUMIN/CREAT 24H UR: 11 MG/G CREATININE (ref 0–30)
MONOCYTES # BLD AUTO: 0.39 THOUSAND/ΜL (ref 0.17–1.22)
MONOCYTES NFR BLD AUTO: 5 % (ref 4–12)
NEUTROPHILS # BLD AUTO: 4.66 THOUSANDS/ΜL (ref 1.85–7.62)
NEUTS SEG NFR BLD AUTO: 59 % (ref 43–75)
NONHDLC SERPL-MCNC: 161 MG/DL
NRBC BLD AUTO-RTO: 0 /100 WBCS
PLATELET # BLD AUTO: 222 THOUSANDS/UL (ref 149–390)
PMV BLD AUTO: 9.8 FL (ref 8.9–12.7)
POTASSIUM SERPL-SCNC: 3.8 MMOL/L (ref 3.5–5.3)
PROT SERPL-MCNC: 7.6 G/DL (ref 6.4–8.2)
RBC # BLD AUTO: 5.4 MILLION/UL (ref 3.81–5.12)
SODIUM SERPL-SCNC: 138 MMOL/L (ref 136–145)
T4 FREE SERPL-MCNC: 1.19 NG/DL (ref 0.76–1.46)
TRIGL SERPL-MCNC: 143 MG/DL
TSH SERPL DL<=0.05 MIU/L-ACNC: 2.9 UIU/ML (ref 0.36–3.74)
WBC # BLD AUTO: 7.85 THOUSAND/UL (ref 4.31–10.16)

## 2021-12-09 PROCEDURE — 82306 VITAMIN D 25 HYDROXY: CPT

## 2021-12-09 PROCEDURE — 3079F DIAST BP 80-89 MM HG: CPT | Performed by: PHYSICIAN ASSISTANT

## 2021-12-09 PROCEDURE — 3060F POS MICROALBUMINURIA REV: CPT | Performed by: PHYSICIAN ASSISTANT

## 2021-12-09 PROCEDURE — 82570 ASSAY OF URINE CREATININE: CPT | Performed by: PHYSICIAN ASSISTANT

## 2021-12-09 PROCEDURE — 3046F HEMOGLOBIN A1C LEVEL >9.0%: CPT | Performed by: PHYSICIAN ASSISTANT

## 2021-12-09 PROCEDURE — 83036 HEMOGLOBIN GLYCOSYLATED A1C: CPT

## 2021-12-09 PROCEDURE — 80053 COMPREHEN METABOLIC PANEL: CPT

## 2021-12-09 PROCEDURE — 3074F SYST BP LT 130 MM HG: CPT | Performed by: PHYSICIAN ASSISTANT

## 2021-12-09 PROCEDURE — 3725F SCREEN DEPRESSION PERFORMED: CPT | Performed by: PHYSICIAN ASSISTANT

## 2021-12-09 PROCEDURE — 85025 COMPLETE CBC W/AUTO DIFF WBC: CPT

## 2021-12-09 PROCEDURE — 99395 PREV VISIT EST AGE 18-39: CPT | Performed by: PHYSICIAN ASSISTANT

## 2021-12-09 PROCEDURE — 84439 ASSAY OF FREE THYROXINE: CPT

## 2021-12-09 PROCEDURE — 84443 ASSAY THYROID STIM HORMONE: CPT

## 2021-12-09 PROCEDURE — 86803 HEPATITIS C AB TEST: CPT

## 2021-12-09 PROCEDURE — 36415 COLL VENOUS BLD VENIPUNCTURE: CPT

## 2021-12-09 PROCEDURE — 80061 LIPID PANEL: CPT

## 2021-12-09 PROCEDURE — 3008F BODY MASS INDEX DOCD: CPT | Performed by: PHYSICIAN ASSISTANT

## 2021-12-09 PROCEDURE — 82043 UR ALBUMIN QUANTITATIVE: CPT | Performed by: PHYSICIAN ASSISTANT

## 2021-12-09 RX ORDER — METFORMIN HYDROCHLORIDE 500 MG/1
500 TABLET, EXTENDED RELEASE ORAL 2 TIMES DAILY WITH MEALS
Qty: 60 TABLET | Refills: 5 | Status: SHIPPED | OUTPATIENT
Start: 2021-12-09 | End: 2022-01-28 | Stop reason: SINTOL

## 2022-01-03 ENCOUNTER — TELEPHONE (OUTPATIENT)
Dept: INTERNAL MEDICINE CLINIC | Facility: CLINIC | Age: 37
End: 2022-01-03

## 2022-01-03 NOTE — TELEPHONE ENCOUNTER
Spoke with the patient and advised her to try taking 1/2 tablet twice daily with meals to see if this improves her symptoms  She verbalized understanding and will give a call back if no improvement

## 2022-01-03 NOTE — TELEPHONE ENCOUNTER
I believe this is a common side effect from Metformin  Did you want her to continue, cut the dose, switch the medication? Please advise - thanks!

## 2022-01-03 NOTE — TELEPHONE ENCOUNTER
Having stool/bathroom issues being on diabetic metformin med       Please advise for pt to switch med or appt needed?   uses [address] in the AM please if we can she states *

## 2022-01-12 ENCOUNTER — TELEPHONE (OUTPATIENT)
Dept: INTERNAL MEDICINE CLINIC | Facility: CLINIC | Age: 37
End: 2022-01-12

## 2022-01-12 NOTE — TELEPHONE ENCOUNTER
Pt called very nervous on phone and  Just found out her  is having Gender dyspmorphia and she wants a therapist to talk to about her life and whats going on,     I mentioned cherise but she didn't know if that was the type of specialty she needed?     Please advise    Cell  [phone]

## 2022-01-12 NOTE — TELEPHONE ENCOUNTER
I would suggest to patient that we refer her to [facility], [facility] may have further suggestions than for the patient

## 2022-01-13 NOTE — TELEPHONE ENCOUNTER
Patient decided that she was going to call her Employer's "Resources for Living" Program first and if she didn't have any luck with them she would call us back

## 2022-01-28 ENCOUNTER — OFFICE VISIT (OUTPATIENT)
Dept: INTERNAL MEDICINE CLINIC | Facility: CLINIC | Age: 37
End: 2022-01-28
Payer: COMMERCIAL

## 2022-01-28 ENCOUNTER — TELEPHONE (OUTPATIENT)
Dept: INTERNAL MEDICINE CLINIC | Facility: CLINIC | Age: 37
End: 2022-01-28

## 2022-01-28 ENCOUNTER — IMMUNIZATIONS (OUTPATIENT)
Dept: FAMILY MEDICINE CLINIC | Facility: HOSPITAL | Age: 37
End: 2022-01-28

## 2022-01-28 VITALS
HEIGHT: 66 IN | WEIGHT: 197.6 LBS | SYSTOLIC BLOOD PRESSURE: 116 MMHG | HEART RATE: 81 BPM | DIASTOLIC BLOOD PRESSURE: 88 MMHG | BODY MASS INDEX: 31.76 KG/M2 | TEMPERATURE: 98.6 F | OXYGEN SATURATION: 98 %

## 2022-01-28 DIAGNOSIS — F33.9 DEPRESSION, RECURRENT (HCC): ICD-10-CM

## 2022-01-28 DIAGNOSIS — E78.2 MIXED HYPERLIPIDEMIA: ICD-10-CM

## 2022-01-28 DIAGNOSIS — E55.9 VITAMIN D DEFICIENCY: ICD-10-CM

## 2022-01-28 DIAGNOSIS — Z23 ENCOUNTER FOR IMMUNIZATION: Primary | ICD-10-CM

## 2022-01-28 DIAGNOSIS — E11.65 UNCONTROLLED TYPE 2 DIABETES MELLITUS WITH HYPERGLYCEMIA (HCC): Primary | ICD-10-CM

## 2022-01-28 DIAGNOSIS — F41.9 ANXIETY: ICD-10-CM

## 2022-01-28 LAB — SL AMB POCT HEMOGLOBIN AIC: 9.5 (ref ?–6.5)

## 2022-01-28 PROCEDURE — 3725F SCREEN DEPRESSION PERFORMED: CPT | Performed by: PHYSICIAN ASSISTANT

## 2022-01-28 PROCEDURE — 3074F SYST BP LT 130 MM HG: CPT | Performed by: PHYSICIAN ASSISTANT

## 2022-01-28 PROCEDURE — 99214 OFFICE O/P EST MOD 30 MIN: CPT | Performed by: PHYSICIAN ASSISTANT

## 2022-01-28 PROCEDURE — 0001A COVID-19 PFIZER VACC 0.3 ML: CPT

## 2022-01-28 PROCEDURE — 3046F HEMOGLOBIN A1C LEVEL >9.0%: CPT | Performed by: PHYSICIAN ASSISTANT

## 2022-01-28 PROCEDURE — 91300 COVID-19 PFIZER VACC 0.3 ML: CPT

## 2022-01-28 PROCEDURE — 3079F DIAST BP 80-89 MM HG: CPT | Performed by: PHYSICIAN ASSISTANT

## 2022-01-28 PROCEDURE — 83036 HEMOGLOBIN GLYCOSYLATED A1C: CPT | Performed by: PHYSICIAN ASSISTANT

## 2022-01-28 RX ORDER — BLOOD-GLUCOSE METER
KIT MISCELLANEOUS
Qty: 1 KIT | Refills: 0 | Status: SHIPPED | OUTPATIENT
Start: 2022-01-28

## 2022-01-28 RX ORDER — LANCETS 33 GAUGE
EACH MISCELLANEOUS
Qty: 200 EACH | Refills: 3 | Status: SHIPPED | OUTPATIENT
Start: 2022-01-28

## 2022-01-28 RX ORDER — BLOOD SUGAR DIAGNOSTIC
STRIP MISCELLANEOUS
Qty: 200 EACH | Refills: 3 | Status: SHIPPED | OUTPATIENT
Start: 2022-01-28

## 2022-01-28 RX ORDER — GLIMEPIRIDE 2 MG/1
2 TABLET ORAL
Qty: 30 TABLET | Refills: 5 | Status: SHIPPED | OUTPATIENT
Start: 2022-01-28 | End: 2022-04-13 | Stop reason: SDUPTHER

## 2022-01-28 RX ORDER — BUSPIRONE HYDROCHLORIDE 5 MG/1
5 TABLET ORAL 2 TIMES DAILY
Qty: 60 TABLET | Refills: 1 | Status: SHIPPED | OUTPATIENT
Start: 2022-01-28 | End: 2022-03-11 | Stop reason: SINTOL

## 2022-01-28 NOTE — PROGRESS NOTES
Assessment/Plan:   Do to intolerance to metformin, we will stop the metformin, start glimepiride 2 mg in the morning with breakfast   Please monitor your blood sugars  Please get in touch with diabetic Education  For anxiety we will start low-dose buspirone 5 mg twice daily  We will plan on getting together in 1 month to discuss the effectiveness of both medications  As we have discussed we plan in the future to add cholesterol-lowering medication  Start vitamin-D 3 OTC 4000 units daily  Quality Measures: BMI Counseling: Body mass index is 31 89 kg/m²  The BMI is above normal  Nutrition recommendations include decreasing portion sizes, decreasing fast food intake, limiting drinks that contain sugar, moderation in carbohydrate intake and reducing intake of cholesterol  Exercise recommendations include exercising 3-5 times per week  Rationale for BMI follow-up plan is due to patient being overweight or obese  Return in 4 weeks (on 2/25/2022) for Diabetes Follow-up, Next scheduled follow up  Diagnoses and all orders for this visit:    Uncontrolled type 2 diabetes mellitus with hyperglycemia (HCC)  -     POCT hemoglobin A1c  -     glimepiride (AMARYL) 2 mg tablet; Take 1 tablet (2 mg total) by mouth daily with breakfast    Mixed hyperlipidemia    Depression, recurrent (HCC)  -     busPIRone (BUSPAR) 5 mg tablet; Take 1 tablet (5 mg total) by mouth 2 (two) times a day    Anxiety  -     busPIRone (BUSPAR) 5 mg tablet; Take 1 tablet (5 mg total) by mouth 2 (two) times a day          Subjective:      Patient ID: Armand Romo is a 31 y o  female  Follow up, labs reviewed with patient    Patient emotionally upset at this visit  Since last visit patient has found out that her  is having problems with gender dysmorphia  Patient has done extensive reading on this, and this has patient greatly upset and anxious  This patient has sought counseling and is going regularly    Along with this there are multiple other psychosocial stressors involving repairs that are needed in the house  Uncontrolled type 2 diabetes:  Fasting blood sugar was 218 with an A1c of 10 5  She had been started on extended release metformin 500 mg twice daily  According to the patient she could not tolerate the medication due to diarrhea and frequent bowel movements  She is asking to change the medication  No complaints of polyuria or polyphagia  Does admit to polydipsia  Renal functions at this time were stable  Mild elevation microalbumin  At last visit she was also referred to  Diabetic Education/dietitian, which she did not follow through with  She continues with poor diet of drinking large quantities of sugary sodas and eating large portions of carbohydrates  She states both parents are diabetic and she knows better however with her current psychological status she finds these to be comfort foods  A1c checked in the office today is 9 5  No complaints of neuropathy symptoms  States she will set up eye exam with her eye care professional     Hyperlipidemia:  Elevated total and LDL cholesterol  We have discussed need to start cholesterol-lowering agent at some point  Vitamin-D deficiency:  Will need to start vitamin-D replacement        ALLERGIES:  Allergies   Allergen Reactions    Shellfish-Derived Products - Food Allergy        CURRENT MEDICATIONS:    Current Outpatient Medications:     levonorgestrel (MIRENA) 20 MCG/24HR IUD, 1 each by Intrauterine route, Disp: , Rfl:     Multiple Vitamin (MULTIVITAMIN PO), Take by mouth, Disp: , Rfl:     Probiotic Product (PROBIOTIC PO), Take by mouth, Disp: , Rfl:     busPIRone (BUSPAR) 5 mg tablet, Take 1 tablet (5 mg total) by mouth 2 (two) times a day, Disp: 60 tablet, Rfl: 1    glimepiride (AMARYL) 2 mg tablet, Take 1 tablet (2 mg total) by mouth daily with breakfast, Disp: 30 tablet, Rfl: 5    ACTIVE PROBLEM LIST:  Patient Active Problem List   Diagnosis    Ulnar neuropathy at elbow, left    Mixed hyperlipidemia    Gastroesophageal reflux disease    Obesity (BMI 35 0-39 9 without comorbidity)    Uncontrolled type 2 diabetes mellitus with hyperglycemia (HCC)    Depression, recurrent (HCC)       PAST MEDICAL HISTORY:  Past Medical History:   Diagnosis Date    Diabetes mellitus ([address] )     GERD (gastroesophageal reflux disease)     Obesity        PAST SURGICAL HISTORY:  Past Surgical History:   Procedure Laterality Date    TOOTH EXTRACTION         FAMILY HISTORY:  Family History   Problem Relation Age of Onset    Diabetes Mother     Thyroid disease Mother     Diabetes Father     Venous thrombosis Maternal Grandfather        SOCIAL HISTORY:  Social History     Socioeconomic History    Marital status: /Civil Union     Spouse name: Not on file    Number of children: Not on file    Years of education: Not on file    Highest education level: Not on file   Occupational History    Not on file   Tobacco Use    Smoking status: Never Smoker    Smokeless tobacco: Never Used   Vaping Use    Vaping Use: Never used   Substance and Sexual Activity    Alcohol use: Yes     Comment: Christopher Sultana Drug use: Never    Sexual activity: Yes     Partners: Male   Other Topics Concern    Not on file   Social History Narrative        1 child    Working-[employer] nights    Hobbies-drawing    Exercise-running, biking    Reg dental care, brush twice daily     Social Determinants of Health     Financial Resource Strain: Not on file   Food Insecurity: Not on file   Transportation Needs: Not on file   Physical Activity: Not on file   Stress: Not on file   Social Connections: Not on file   Intimate Partner Violence: Not on file   Housing Stability: Not on file       Review of Systems   Constitutional: Negative for activity change, chills, fatigue and fever  HENT: Negative for congestion  Eyes: Negative for discharge     Respiratory: Negative for cough, chest tightness and shortness of breath  Cardiovascular: Negative for chest pain, palpitations and leg swelling  Gastrointestinal: Negative for abdominal pain  Endocrine: Negative for polydipsia, polyphagia and polyuria  Genitourinary: Negative for difficulty urinating  Musculoskeletal: Negative for arthralgias and myalgias  Skin: Negative for rash  Allergic/Immunologic: Negative for immunocompromised state  Neurological: Negative for dizziness, syncope, weakness, light-headedness and headaches  Hematological: Negative for adenopathy  Does not bruise/bleed easily  Psychiatric/Behavioral: Positive for decreased concentration, dysphoric mood and sleep disturbance  Negative for suicidal ideas  The patient is nervous/anxious  Objective:  Vitals:    01/28/22 0956   BP: 116/88   BP Location: Right arm   Patient Position: Sitting   Cuff Size: Adult   Pulse: 81   Temp: 98 6 °F (37 °C)   TempSrc: Tympanic   SpO2: 98%   Weight: 89 6 kg (197 lb 9 6 oz)   Height: 5' 6" (1 676 m)     Body mass index is 31 89 kg/m²  Physical Exam  Vitals and nursing note reviewed  Constitutional:       General: She is not in acute distress  Appearance: She is well-developed  HENT:      Head: Normocephalic and atraumatic  Eyes:      Extraocular Movements: Extraocular movements intact  Pupils: Pupils are equal, round, and reactive to light  Neck:      Thyroid: No thyromegaly  Vascular: No carotid bruit or JVD  Cardiovascular:      Rate and Rhythm: Normal rate and regular rhythm  Heart sounds: Normal heart sounds  Pulmonary:      Effort: Pulmonary effort is normal  No respiratory distress  Breath sounds: Normal breath sounds  Musculoskeletal:      Cervical back: Neck supple  Right lower leg: No edema  Left lower leg: No edema  Lymphadenopathy:      Cervical: No cervical adenopathy  Skin:     General: Skin is warm and dry  Findings: No rash     Neurological:      General: No focal deficit present  Mental Status: She is alert and oriented to person, place, and time  Mental status is at baseline  Psychiatric:         Mood and Affect: Mood normal          Behavior: Behavior normal            RESULTS:    Recent Results (from the past 1008 hour(s))   POCT hemoglobin A1c    Collection Time: 01/28/22 10:38 AM   Result Value Ref Range    Hemoglobin A1C 9 5 (A) 6 5       This note was created with voice recognition software  Phonic, grammatical and spelling errors may be present within the note as a result

## 2022-01-28 NOTE — TELEPHONE ENCOUNTER
I went back to the patient's note and sent in glucometer, test strips, and lancets  I thought when we were talking she told me she had a glucometer, thanks  For bringing this up

## 2022-01-28 NOTE — TELEPHONE ENCOUNTER
Patient was called and advised to  vitamin d  Patient was at the pharmacy at time of call and did state she will pick it up  She was asking however if she should have a glucose monitor to be able to check her blood sugars as she was advised to do  She was asking if so, can she have an order for one as well as test strips and lancets sent to the pharmacy for her

## 2022-01-28 NOTE — PATIENT INSTRUCTIONS
Do to intolerance to metformin, we will stop the metformin, start glimepiride 2 mg in the morning with breakfast   Please monitor your blood sugars  Please get in touch with diabetic Education  For anxiety we will start low-dose buspirone 5 mg twice daily  We will plan on getting together in 1 month to discuss the effectiveness of both medications  As we have discussed we plan in the future to add cholesterol-lowering medication  10% - bad control"> 10% - bad control,Hemoglobin A1c (HbA1c) greater than 10% indicating poor diabetic control,Haemoglobin A1c greater than 10% indicating poor diabetic control">   Diabetes Mellitus Type 2 in Adults, Ambulatory Care   GENERAL INFORMATION:   Diabetes mellitus type 2  is a disease that affects how your body uses glucose (sugar)  Insulin helps move sugar out of the blood so it can be used for energy  Normally, when the blood sugar level increases, the pancreas makes more insulin  Type 2 diabetes develops because either the body cannot make enough insulin, or it cannot use the insulin correctly  After many years, your pancreas may stop making insulin  Common symptoms include the following:   · More hunger or thirst than usual     · Frequent urination     · Weight loss without trying     · Blurred vision  Seek immediate care for the following symptoms:   · Severe abdominal pain, or pain that spreads to your back  You may also be vomiting  · Trouble staying awake or focusing    · Shaking or sweating    · Blurred or double vision    · Breath has a fruity, sweet smell    · Breathing is deep and labored, or rapid and shallow    · Heartbeat is fast and weak  Treatment for diabetes mellitus type 2  includes keeping your blood sugar at a normal level  You must eat the right foods, and exercise regularly  You may also need medicine if you cannot control your blood sugar level with nutrition and exercise    Manage diabetes mellitus type 2:   · Check your blood sugar level   You will be taught how to check a small drop of blood in a glucose monitor  Ask your healthcare provider when and how often to check during the day  Ask your healthcare provider what your blood sugar levels should be when you check them  · Keep track of carbohydrates (sugar and starchy foods)  Your blood sugar level can get too high if you eat too many carbohydrates  Your dietitian will help you plan meals and snacks that have the right amount of carbohydrates  · Eat low-fat foods  Some examples are skinless chicken and low-fat milk  · Eat less sodium (salt)  Some examples of high-sodium foods to limit are soy sauce, potato chips, and soup  Do not add salt to food you cook  Limit your use of table salt  · Eat high-fiber foods  Foods that are a good source of fiber include vegetables, whole grain bread, and beans  · Limit alcohol  Alcohol affects your blood sugar level and can make it harder to manage your diabetes  Women should limit alcohol to 1 drink a day  Men should limit alcohol to 2 drinks a day  A drink of alcohol is 12 ounces of beer, 5 ounces of wine, or 1½ ounces of liquor  · Get regular exercise  Exercise can help keep your blood sugar level steady, decrease your risk of heart disease, and help you lose weight  Exercise for at least 30 minutes, 5 days a week  Include muscle strengthening activities 2 days each week  Work with your healthcare provider to create an exercise plan  · Check your feet each day  for injuries or open sores  Ask your healthcare provider for activities you can do if you have an open sore  · Quit smoking  If you smoke, it is never too late to quit  Smoking can worsen the problems that may occur with diabetes  Ask your healthcare provider for information about how to stop smoking if you are having trouble quitting  · Ask about your weight:  Ask healthcare providers if you need to lose weight, and how much to lose   Ask them to help you with a weight loss program  Even a 10 to 15 pound weight loss can help you manage your blood sugar level  · Carry medical alert identification  Wear medical alert jewelry or carry a card that says you have diabetes  Ask your healthcare provider where to get these items  · Ask about vaccines  Diabetes puts you at risk of serious illness if you get the flu, pneumonia, or hepatitis  Ask your healthcare provider if you should get a flu, pneumonia, or hepatitis B vaccine, and when to get the vaccine  Follow up with your healthcare provider as directed:  Write down your questions so you remember to ask them during your visits  CARE AGREEMENT:   You have the right to help plan your care  Learn about your health condition and how it may be treated  Discuss treatment options with your caregivers to decide what care you want to receive  You always have the right to refuse treatment  The above information is an  only  It is not intended as medical advice for individual conditions or treatments  Talk to your doctor, nurse or pharmacist before following any medical regimen to see if it is safe and effective for you  © 2014 [address] is for End User's use only and may not be sold, redistributed or otherwise used for commercial purposes  All illustrations and images included in CareNotes® are the copyrighted property of [employer] D [employer] M [employer]  or [employer]  10% - bad control"> 10% - bad control,Hemoglobin A1c (HbA1c) greater than 10% indicating poor diabetic control,Haemoglobin A1c greater than 10% indicating poor diabetic control">   Diabetes Mellitus Type 2 in Adults, Ambulatory Care   GENERAL INFORMATION:   Diabetes mellitus type 2  is a disease that affects how your body uses glucose (sugar)  Insulin helps move sugar out of the blood so it can be used for energy  Normally, when the blood sugar level increases, the pancreas makes more insulin   Type 2 diabetes develops because either the body cannot make enough insulin, or it cannot use the insulin correctly  After many years, your pancreas may stop making insulin  Common symptoms include the following:   · More hunger or thirst than usual     · Frequent urination     · Weight loss without trying     · Blurred vision  Seek immediate care for the following symptoms:   · Severe abdominal pain, or pain that spreads to your back  You may also be vomiting  · Trouble staying awake or focusing    · Shaking or sweating    · Blurred or double vision    · Breath has a fruity, sweet smell    · Breathing is deep and labored, or rapid and shallow    · Heartbeat is fast and weak  Treatment for diabetes mellitus type 2  includes keeping your blood sugar at a normal level  You must eat the right foods, and exercise regularly  You may also need medicine if you cannot control your blood sugar level with nutrition and exercise  Manage diabetes mellitus type 2:   · Check your blood sugar level  You will be taught how to check a small drop of blood in a glucose monitor  Ask your healthcare provider when and how often to check during the day  Ask your healthcare provider what your blood sugar levels should be when you check them  · Keep track of carbohydrates (sugar and starchy foods)  Your blood sugar level can get too high if you eat too many carbohydrates  Your dietitian will help you plan meals and snacks that have the right amount of carbohydrates  · Eat low-fat foods  Some examples are skinless chicken and low-fat milk  · Eat less sodium (salt)  Some examples of high-sodium foods to limit are soy sauce, potato chips, and soup  Do not add salt to food you cook  Limit your use of table salt  · Eat high-fiber foods  Foods that are a good source of fiber include vegetables, whole grain bread, and beans  · Limit alcohol  Alcohol affects your blood sugar level and can make it harder to manage your diabetes   Women should limit alcohol to 1 drink a day  Men should limit alcohol to 2 drinks a day  A drink of alcohol is 12 ounces of beer, 5 ounces of wine, or 1½ ounces of liquor  · Get regular exercise  Exercise can help keep your blood sugar level steady, decrease your risk of heart disease, and help you lose weight  Exercise for at least 30 minutes, 5 days a week  Include muscle strengthening activities 2 days each week  Work with your healthcare provider to create an exercise plan  · Check your feet each day  for injuries or open sores  Ask your healthcare provider for activities you can do if you have an open sore  · Quit smoking  If you smoke, it is never too late to quit  Smoking can worsen the problems that may occur with diabetes  Ask your healthcare provider for information about how to stop smoking if you are having trouble quitting  · Ask about your weight:  Ask healthcare providers if you need to lose weight, and how much to lose  Ask them to help you with a weight loss program  Even a 10 to 15 pound weight loss can help you manage your blood sugar level  · Carry medical alert identification  Wear medical alert jewelry or carry a card that says you have diabetes  Ask your healthcare provider where to get these items  · Ask about vaccines  Diabetes puts you at risk of serious illness if you get the flu, pneumonia, or hepatitis  Ask your healthcare provider if you should get a flu, pneumonia, or hepatitis B vaccine, and when to get the vaccine  Follow up with your healthcare provider as directed:  Write down your questions so you remember to ask them during your visits  CARE AGREEMENT:   You have the right to help plan your care  Learn about your health condition and how it may be treated  Discuss treatment options with your caregivers to decide what care you want to receive  You always have the right to refuse treatment  The above information is an  only   It is not intended as medical advice for individual conditions or treatments  Talk to your doctor, nurse or pharmacist before following any medical regimen to see if it is safe and effective for you  © 2014 [address] is for End User's use only and may not be sold, redistributed or otherwise used for commercial purposes  All illustrations and images included in CareNotes® are the copyrighted property of A D A M , Inc  or [employer]

## 2022-01-28 NOTE — PROGRESS NOTES
Patient's shoes and socks removed  Right Foot/Ankle   Right Foot Inspection  Skin Exam: skin normal and skin intact  No dry skin, no warmth, no callus, no erythema, no maceration, no abnormal color, no pre-ulcer, no ulcer and no callus  Toe Exam: ROM and strength within normal limits  Sensory   Monofilament testing: intact    Vascular  The right DP pulse is 2+  Right Toe  - Comprehensive Exam  Ecchymosis: none  Swelling: none         Left Foot/Ankle  Left Foot Inspection  Skin Exam: skin normal and skin intact  No dry skin, no warmth, no erythema, no maceration, normal color, no pre-ulcer, no ulcer and no callus  Toe Exam: ROM and strength within normal limits  Sensory   Monofilament testing: intact    Vascular  The left DP pulse is 2+       Left Toe  - Comprehensive Exam  Ecchymosis: none  Swelling: none           Assign Risk Category  No deformity present  No loss of protective sensation  No weak pulses  Risk: 0

## 2022-02-14 ENCOUNTER — TELEPHONE (OUTPATIENT)
Dept: ADMINISTRATIVE | Facility: OTHER | Age: 37
End: 2022-02-14

## 2022-02-14 NOTE — TELEPHONE ENCOUNTER
----- Message from Author Obrien, [address] [place] sent at 2/14/2022 10:36 AM EST -----  Regarding: dm eye exam  02/14/22 10:37 AM    Hello, our patient Lisandro Early has had Diabetic Eye Exam completed/performed  Please assist in updating the patient chart by pulling the document from the Media Tab  The date of service is 2/11/22       Thank you,  Author SHORTY Obrien  [facility]

## 2022-02-16 NOTE — TELEPHONE ENCOUNTER
Upon review of the In Basket request we reviewed the chart and found this was previously resulted  This date of service 2/11/2022 is reflected in HM    Any additional questions or concerns should be emailed to the Practice Liaisons via [email or web address] email, please do not reply via In Basket      Thank you  Estrella Mix

## 2022-03-11 ENCOUNTER — OFFICE VISIT (OUTPATIENT)
Dept: INTERNAL MEDICINE CLINIC | Facility: CLINIC | Age: 37
End: 2022-03-11
Payer: COMMERCIAL

## 2022-03-11 VITALS
WEIGHT: 197 LBS | HEART RATE: 87 BPM | HEIGHT: 66 IN | OXYGEN SATURATION: 98 % | SYSTOLIC BLOOD PRESSURE: 122 MMHG | TEMPERATURE: 98.4 F | BODY MASS INDEX: 31.66 KG/M2 | RESPIRATION RATE: 16 BRPM | DIASTOLIC BLOOD PRESSURE: 82 MMHG

## 2022-03-11 DIAGNOSIS — E78.2 MIXED HYPERLIPIDEMIA: ICD-10-CM

## 2022-03-11 DIAGNOSIS — F33.9 DEPRESSION, RECURRENT (HCC): Primary | ICD-10-CM

## 2022-03-11 DIAGNOSIS — E11.65 UNCONTROLLED TYPE 2 DIABETES MELLITUS WITH HYPERGLYCEMIA (HCC): ICD-10-CM

## 2022-03-11 LAB — SL AMB POCT GLUCOSE BLD: 179

## 2022-03-11 PROCEDURE — 3008F BODY MASS INDEX DOCD: CPT | Performed by: PHYSICIAN ASSISTANT

## 2022-03-11 PROCEDURE — 99214 OFFICE O/P EST MOD 30 MIN: CPT | Performed by: PHYSICIAN ASSISTANT

## 2022-03-11 PROCEDURE — 3725F SCREEN DEPRESSION PERFORMED: CPT | Performed by: PHYSICIAN ASSISTANT

## 2022-03-11 PROCEDURE — 3074F SYST BP LT 130 MM HG: CPT | Performed by: PHYSICIAN ASSISTANT

## 2022-03-11 PROCEDURE — 3079F DIAST BP 80-89 MM HG: CPT | Performed by: PHYSICIAN ASSISTANT

## 2022-03-11 PROCEDURE — 82948 REAGENT STRIP/BLOOD GLUCOSE: CPT | Performed by: PHYSICIAN ASSISTANT

## 2022-03-11 RX ORDER — ESCITALOPRAM OXALATE 5 MG/1
5 TABLET ORAL DAILY
Qty: 30 TABLET | Refills: 5 | Status: SHIPPED | OUTPATIENT
Start: 2022-03-11 | End: 2022-04-13 | Stop reason: SDUPTHER

## 2022-03-11 RX ORDER — ROSUVASTATIN CALCIUM 5 MG/1
5 TABLET, COATED ORAL DAILY
Qty: 30 TABLET | Refills: 5 | Status: SHIPPED | OUTPATIENT
Start: 2022-03-11

## 2022-03-11 NOTE — PROGRESS NOTES
Assessment/Plan:   Patient Instructions   Will stop the Buspar due to perceived side effects  Will try escitalopram for next month  Also add Rosuvastatin for cholesterol  Follow up in one month, sooner as needed  Quality Measures:       Return in about 4 weeks (around 4/8/2022) for Recheck  Diagnoses and all orders for this visit:    Depression, recurrent ([facility] [address] )  -     escitalopram (LEXAPRO) 5 mg tablet; Take 1 tablet (5 mg total) by mouth daily    Uncontrolled type 2 diabetes mellitus with hyperglycemia (HCC)    Mixed hyperlipidemia  -     rosuvastatin (CRESTOR) 5 mg tablet; Take 1 tablet (5 mg total) by mouth daily          Subjective:      Patient ID: Anderson Sweeney is a 31 y o  female  Follow-up    Type 2 diabetes uncontrolled  Intolerant to metformin  Started low-dose glimepiride 2 mg daily  She is tolerating the medication  No description of hypoglycemia  Still having polydipsia  No polyuria or polyphagia  Has not been able to developed the "courage" to poke her fingers to do blood sugars  Random blood sugar today was 179  Encourage patient to try to monitor blood sugars fasting at least 3 times per week  Diabetic Education has tried to get in touch with her, I would like her to work with diabetic dietitian, she will try to contact them for possibly a virtual visit  Hyperlipidemia:  Agreeable to start lipid lowering agent  Discussion held  Potential side effects discussed  Depression /anxiety:  Patient felt that the BusPar made her feel paranoid at times  She therefore stop the medication and this symptom improved  Continues with multiple psychosocial stressors  His speaking with counselor about these issues  Making little progress  No suicidal thoughts at this time  Agreeable to try medication for depression  Will possibly need psychiatry help in the future        ALLERGIES:  Allergies   Allergen Reactions    Shellfish-Derived Products - Food Allergy        CURRENT MEDICATIONS:    Current Outpatient Medications:     glimepiride (AMARYL) 2 mg tablet, Take 1 tablet (2 mg total) by mouth daily with breakfast, Disp: 30 tablet, Rfl: 5    levonorgestrel (MIRENA) 20 MCG/24HR IUD, 1 each by Intrauterine route, Disp: , Rfl:     Multiple Vitamin (MULTIVITAMIN PO), Take by mouth, Disp: , Rfl:     Probiotic Product (PROBIOTIC PO), Take by mouth, Disp: , Rfl:     Blood Glucose Monitoring Suppl (OneTouch Verio Reflect) w/Device KIT, Check blood sugars twice daily  Please substitute with appropriate alternative as covered by patient's insurance  Dx: E11 65 (Patient not taking: Reported on 3/11/2022 ), Disp: 1 kit, Rfl: 0    escitalopram (LEXAPRO) 5 mg tablet, Take 1 tablet (5 mg total) by mouth daily, Disp: 30 tablet, Rfl: 5    glucose blood (OneTouch Verio) test strip, Check blood sugars twice daily  Please substitute with appropriate alternative as covered by patient's insurance  Dx: E11 65 (Patient not taking: Reported on 3/11/2022 ), Disp: 200 each, Rfl: 3    OneTouch Delica Lancets 69F MISC, Check blood sugars twice daily  Please substitute with appropriate alternative as covered by patient's insurance   Dx: E11 65 (Patient not taking: Reported on 3/11/2022 ), Disp: 200 each, Rfl: 3    rosuvastatin (CRESTOR) 5 mg tablet, Take 1 tablet (5 mg total) by mouth daily, Disp: 30 tablet, Rfl: 5    ACTIVE PROBLEM LIST:  Patient Active Problem List   Diagnosis    Ulnar neuropathy at elbow, left    Mixed hyperlipidemia    Gastroesophageal reflux disease    Obesity (BMI 35 0-39 9 without comorbidity)    Uncontrolled type 2 diabetes mellitus with hyperglycemia (HCC)    Depression, recurrent (HCC)    Vitamin D deficiency    Anxiety       PAST MEDICAL HISTORY:  Past Medical History:   Diagnosis Date    Diabetes mellitus ([facility] [address] )     GERD (gastroesophageal reflux disease)     Obesity        PAST SURGICAL HISTORY:  Past Surgical History:   Procedure Laterality Date    TOOTH EXTRACTION FAMILY HISTORY:  Family History   Problem Relation Age of Onset    Diabetes Mother     Thyroid disease Mother     Diabetes Father     Venous thrombosis Maternal Grandfather        SOCIAL HISTORY:  Social History     Socioeconomic History    Marital status: /Civil Union     Spouse name: Not on file    Number of children: Not on file    Years of education: Not on file    Highest education level: Not on file   Occupational History    Not on file   Tobacco Use    Smoking status: Never Smoker    Smokeless tobacco: Never Used   Vaping Use    Vaping Use: Never used   Substance and Sexual Activity    Alcohol use: Yes     Comment: Faustino Dash Drug use: Never    Sexual activity: Yes     Partners: Male   Other Topics Concern    Not on file   Social History Narrative        1 child    [email or web address]    Hobbies-drawing    Exercise-running, biking    Reg dental care, brush twice daily     Social Determinants of Health     Financial Resource Strain: Not on file   Food Insecurity: Not on file   Transportation Needs: Not on file   Physical Activity: Not on file   Stress: Not on file   Social Connections: Not on file   Intimate Partner Violence: Not on file   Housing Stability: Not on file       Review of Systems   Constitutional: Negative for activity change, chills, fatigue and fever  HENT: Negative for congestion  Eyes: Negative for discharge  Respiratory: Negative for cough, chest tightness and shortness of breath  Cardiovascular: Negative for chest pain, palpitations and leg swelling  Gastrointestinal: Negative for abdominal pain  Endocrine: Positive for polydipsia  Negative for polyphagia and polyuria  Genitourinary: Negative for difficulty urinating  Musculoskeletal: Negative for arthralgias and myalgias  Skin: Negative for rash  Allergic/Immunologic: Negative for immunocompromised state     Neurological: Negative for dizziness, syncope, weakness, light-headedness and headaches  Hematological: Negative for adenopathy  Does not bruise/bleed easily  Psychiatric/Behavioral: Positive for dysphoric mood and sleep disturbance  Negative for suicidal ideas  The patient is not nervous/anxious  Objective:  Vitals:    03/11/22 1139   BP: 122/82   BP Location: Left arm   Patient Position: Sitting   Cuff Size: Standard   Pulse: 87   Resp: 16   Temp: 98 4 °F (36 9 °C)   TempSrc: Tympanic   SpO2: 98%   Weight: 89 4 kg (197 lb)   Height: 5' 6" (1 676 m)     Body mass index is 31 8 kg/m²  Physical Exam  Vitals and nursing note reviewed  Constitutional:       General: She is not in acute distress  Appearance: She is well-developed  HENT:      Head: Normocephalic and atraumatic  Eyes:      Extraocular Movements: Extraocular movements intact  Pupils: Pupils are equal, round, and reactive to light  Pulmonary:      Effort: Pulmonary effort is normal  No respiratory distress  Breath sounds: Normal breath sounds  Musculoskeletal:      Right lower leg: No edema  Left lower leg: No edema  Skin:     General: Skin is warm and dry  Findings: No rash  Neurological:      General: No focal deficit present  Mental Status: She is alert and oriented to person, place, and time  Mental status is at baseline  Psychiatric:         Mood and Affect: Mood normal          Behavior: Behavior normal            RESULTS:    Recent Results (from the past 1008 hour(s))    Diabetes Eye Exam    Collection Time: 02/11/22 12:00 AM   Result Value Ref Range    Right Eye Diabetic Retinopathy None     Left Eye Diabetic Retinopathy None        This note was created with voice recognition software  Phonic, grammatical and spelling errors may be present within the note as a result

## 2022-03-11 NOTE — PATIENT INSTRUCTIONS
Will stop the Buspar due to perceived side effects  Will try escitalopram for next month  Also add Rosuvastatin for cholesterol  Follow up in one month, sooner as needed

## 2022-04-12 ENCOUNTER — TELEPHONE (OUTPATIENT)
Dept: OTHER | Facility: OTHER | Age: 37
End: 2022-04-12

## 2022-04-13 ENCOUNTER — OFFICE VISIT (OUTPATIENT)
Dept: INTERNAL MEDICINE CLINIC | Facility: CLINIC | Age: 37
End: 2022-04-13
Payer: COMMERCIAL

## 2022-04-13 VITALS
WEIGHT: 197.09 LBS | TEMPERATURE: 98 F | OXYGEN SATURATION: 98 % | BODY MASS INDEX: 31.68 KG/M2 | SYSTOLIC BLOOD PRESSURE: 126 MMHG | DIASTOLIC BLOOD PRESSURE: 82 MMHG | HEART RATE: 87 BPM | HEIGHT: 66 IN

## 2022-04-13 DIAGNOSIS — J30.9 ALLERGIC RHINITIS, UNSPECIFIED SEASONALITY, UNSPECIFIED TRIGGER: ICD-10-CM

## 2022-04-13 DIAGNOSIS — F33.9 DEPRESSION, RECURRENT (HCC): ICD-10-CM

## 2022-04-13 DIAGNOSIS — E11.65 UNCONTROLLED TYPE 2 DIABETES MELLITUS WITH HYPERGLYCEMIA (HCC): Primary | ICD-10-CM

## 2022-04-13 DIAGNOSIS — E55.9 VITAMIN D DEFICIENCY: ICD-10-CM

## 2022-04-13 DIAGNOSIS — E78.2 MIXED HYPERLIPIDEMIA: ICD-10-CM

## 2022-04-13 PROBLEM — E66.09 CLASS 1 OBESITY DUE TO EXCESS CALORIES WITH SERIOUS COMORBIDITY AND BODY MASS INDEX (BMI) OF 31.0 TO 31.9 IN ADULT: Status: ACTIVE | Noted: 2020-09-16

## 2022-04-13 LAB — SL AMB POCT HEMOGLOBIN AIC: 7.4 (ref ?–6.5)

## 2022-04-13 PROCEDURE — 3079F DIAST BP 80-89 MM HG: CPT | Performed by: PHYSICIAN ASSISTANT

## 2022-04-13 PROCEDURE — 3051F HG A1C>EQUAL 7.0%<8.0%: CPT | Performed by: PHYSICIAN ASSISTANT

## 2022-04-13 PROCEDURE — 3074F SYST BP LT 130 MM HG: CPT | Performed by: PHYSICIAN ASSISTANT

## 2022-04-13 PROCEDURE — 99214 OFFICE O/P EST MOD 30 MIN: CPT | Performed by: PHYSICIAN ASSISTANT

## 2022-04-13 PROCEDURE — 3008F BODY MASS INDEX DOCD: CPT | Performed by: PHYSICIAN ASSISTANT

## 2022-04-13 PROCEDURE — 83036 HEMOGLOBIN GLYCOSYLATED A1C: CPT | Performed by: PHYSICIAN ASSISTANT

## 2022-04-13 RX ORDER — ESCITALOPRAM OXALATE 10 MG/1
10 TABLET ORAL DAILY
Qty: 30 TABLET | Refills: 5 | Status: SHIPPED | OUTPATIENT
Start: 2022-04-13

## 2022-04-13 RX ORDER — GLIMEPIRIDE 2 MG/1
TABLET ORAL
Qty: 45 TABLET | Refills: 5 | Status: SHIPPED | OUTPATIENT
Start: 2022-04-13

## 2022-04-13 NOTE — PROGRESS NOTES
Assessment/Plan:      Quality Measures:       Return in about 3 months (around 7/13/2022) for Next scheduled follow up  Diagnoses and all orders for this visit:    Uncontrolled type 2 diabetes mellitus with hyperglycemia (HCC)  -     glimepiride (AMARYL) 2 mg tablet; One tablet in the morning with breakfast, and 1/2 tablet with dinner   -     Comprehensive metabolic panel; Future  -     Hemoglobin A1C; Future    Depression, recurrent (HCC)  -     escitalopram (LEXAPRO) 10 mg tablet; Take 1 tablet (10 mg total) by mouth daily    Mixed hyperlipidemia  -     Lipid panel; Future    Allergic rhinitis, unspecified seasonality, unspecified trigger  -     Vitamin D 25 hydroxy; Future    Vitamin D deficiency          Subjective:      Patient ID: Mario Dawkins is a 31 y o  female  Follow-up    Patient informs me that she just was able to get in touch with diabetic Education to get a virtual appointment for later this month  Uncontrolled diabetes:  States she has modified her diet in that she has cut out her sugar sodas  She is drinking sugar free sodas  She is currently taking glimepiride 2 mg daily  An A1c checked in the office today was 7 7 which is an improvement from 9 4  2 months ago  She also is trying to modify her carbohydrates  Some polyuria present  Depression:  Feels that the escitalopram has been helpful but not completely  Agreeable to increase the dose to 10 mg daily  Hyperlipidemia:  Our plan is to add a cholesterol-lowering medication at next visit  Recurrent ear congestion, sore throat especially respiring fall:  Does admit to postnasal drip, runny nose and sneezing  ALLERGIES:  Allergies   Allergen Reactions    Shellfish-Derived Products - Food Allergy        CURRENT MEDICATIONS:    Current Outpatient Medications:     Blood Glucose Monitoring Suppl (OneTouch Verio Reflect) w/Device KIT, Check blood sugars twice daily   Please substitute with appropriate alternative as covered by patient's insurance  Dx: E11 65, Disp: 1 kit, Rfl: 0    escitalopram (LEXAPRO) 10 mg tablet, Take 1 tablet (10 mg total) by mouth daily, Disp: 30 tablet, Rfl: 5    glimepiride (AMARYL) 2 mg tablet, One tablet in the morning with breakfast, and 1/2 tablet with dinner , Disp: 45 tablet, Rfl: 5    glucose blood (OneTouch Verio) test strip, Check blood sugars twice daily  Please substitute with appropriate alternative as covered by patient's insurance  Dx: E11 65, Disp: 200 each, Rfl: 3    levonorgestrel (MIRENA) 20 MCG/24HR IUD, 1 each by Intrauterine route, Disp: , Rfl:     Multiple Vitamin (MULTIVITAMIN PO), Take by mouth, Disp: , Rfl:     OneTouch Delica Lancets 71R MISC, Check blood sugars twice daily  Please substitute with appropriate alternative as covered by patient's insurance   Dx: E11 65, Disp: 200 each, Rfl: 3    Probiotic Product (PROBIOTIC PO), Take by mouth, Disp: , Rfl:     rosuvastatin (CRESTOR) 5 mg tablet, Take 1 tablet (5 mg total) by mouth daily, Disp: 30 tablet, Rfl: 5    ACTIVE PROBLEM LIST:  Patient Active Problem List   Diagnosis    Ulnar neuropathy at elbow, left    Mixed hyperlipidemia    Gastroesophageal reflux disease    Class 1 obesity due to excess calories with serious comorbidity and body mass index (BMI) of 31 0 to 31 9 in adult    Uncontrolled type 2 diabetes mellitus with hyperglycemia (HCC)    Depression, recurrent (HCC)    Vitamin D deficiency    Anxiety       PAST MEDICAL HISTORY:  Past Medical History:   Diagnosis Date    Diabetes mellitus ([address] )     GERD (gastroesophageal reflux disease)     Obesity        PAST SURGICAL HISTORY:  Past Surgical History:   Procedure Laterality Date    TOOTH EXTRACTION         FAMILY HISTORY:  Family History   Problem Relation Age of Onset    Diabetes Mother     Thyroid disease Mother     Diabetes Father     Venous thrombosis Maternal Grandfather        SOCIAL HISTORY:  Social History     Socioeconomic History    Marital status: /Civil Union     Spouse name: Not on file    Number of children: Not on file    Years of education: Not on file    Highest education level: Not on file   Occupational History    Not on file   Tobacco Use    Smoking status: Never Smoker    Smokeless tobacco: Never Used   Vaping Use    Vaping Use: Never used   Substance and Sexual Activity    Alcohol use: Yes     Comment: Adri Gann Drug use: Never    Sexual activity: Yes     Partners: Male   Other Topics Concern    Not on file   Social History Narrative        1 child    Working-[employer] nights    Hobbies-drawing    Exercise-running, biking    Reg dental care, brush twice daily     Social Determinants of Health     Financial Resource Strain: Not on file   Food Insecurity: Not on file   Transportation Needs: Not on file   Physical Activity: Not on file   Stress: Not on file   Social Connections: Not on file   Intimate Partner Violence: Not on file   Housing Stability: Not on file       Review of Systems   Constitutional: Positive for fatigue  Negative for activity change, chills and fever  HENT: Positive for congestion, ear pain, postnasal drip, rhinorrhea, sinus pressure, sneezing and sore throat  Eyes: Positive for redness and itching  Negative for discharge  Respiratory: Negative for cough, chest tightness and shortness of breath  Cardiovascular: Negative for chest pain, palpitations and leg swelling  Gastrointestinal: Negative for abdominal pain  Endocrine: Positive for polyuria  Negative for polydipsia and polyphagia  Genitourinary: Negative for difficulty urinating  Musculoskeletal: Negative for arthralgias and myalgias  Skin: Negative for rash  Allergic/Immunologic: Negative for immunocompromised state  Neurological: Negative for dizziness, syncope, weakness, light-headedness and headaches  Hematological: Negative for adenopathy  Does not bruise/bleed easily     Psychiatric/Behavioral: Negative for dysphoric mood and suicidal ideas  The patient is not nervous/anxious  Objective:  Vitals:    04/13/22 1346   BP: 126/82   BP Location: Right arm   Patient Position: Sitting   Cuff Size: Adult   Pulse: 87   Temp: 98 °F (36 7 °C)   TempSrc: Tympanic   SpO2: 98%   Weight: 89 4 kg (197 lb 1 5 oz)   Height: 5' 6" (1 676 m)     Body mass index is 31 81 kg/m²  Physical Exam  Vitals and nursing note reviewed  Constitutional:       General: She is not in acute distress  Appearance: She is well-developed  HENT:      Head: Normocephalic and atraumatic  Comments: HEENT-granular, injected conjunctivae, TM's with fluid behind,nasal mucosa pale and boggy with clear/white mucoid drainage, granular soft palate, posterior pharynx with clear/white post nasal drainage, no sinus tenderness  Eyes:      Extraocular Movements: Extraocular movements intact  Pupils: Pupils are equal, round, and reactive to light  Neck:      Thyroid: No thyromegaly  Vascular: No carotid bruit or JVD  Cardiovascular:      Rate and Rhythm: Normal rate and regular rhythm  Heart sounds: Normal heart sounds  Pulmonary:      Effort: Pulmonary effort is normal  No respiratory distress  Breath sounds: Normal breath sounds  Musculoskeletal:      Cervical back: Neck supple  Right lower leg: No edema  Left lower leg: No edema  Lymphadenopathy:      Cervical: No cervical adenopathy  Skin:     General: Skin is warm and dry  Findings: No rash  Neurological:      General: No focal deficit present  Mental Status: She is alert and oriented to person, place, and time  Mental status is at baseline     Psychiatric:         Mood and Affect: Mood normal          Behavior: Behavior normal            RESULTS:    Recent Results (from the past 1008 hour(s))   POCT blood glucose    Collection Time: 03/11/22 12:43 PM   Result Value Ref Range    GLUCOSE         This note was created with voice recognition software  Phonic, grammatical and spelling errors may be present within the note as a result

## 2022-04-22 ENCOUNTER — TELEMEDICINE (OUTPATIENT)
Dept: ENDOCRINOLOGY | Facility: OTHER | Age: 37
End: 2022-04-22
Payer: COMMERCIAL

## 2022-04-22 DIAGNOSIS — E11.65 UNCONTROLLED TYPE 2 DIABETES MELLITUS WITH HYPERGLYCEMIA (HCC): ICD-10-CM

## 2022-04-22 PROCEDURE — 97802 MEDICAL NUTRITION INDIV IN: CPT | Performed by: DIETITIAN, REGISTERED

## 2022-04-22 NOTE — PROGRESS NOTES
Virtual Regular Visit    Verification of patient location:    Patient is located in the following state in which I hold an active license [place]      Assessment/Plan:    Problem List Items Addressed This Visit        Endocrine    Uncontrolled type 2 diabetes mellitus with hyperglycemia ([facility] [address] )               Reason for visit is   Chief Complaint   Patient presents with    Diabetes Type 2    Virtual Regular Visit        Encounter provider Rizwana Weaver    Provider located at [address] [place] [id number] [phone]      Recent Visits  No visits were found meeting these conditions  Showing recent visits within past 7 days and meeting all other requirements  Today's Visits  Date Type Provider Dept   04/22/22 Telemedicine Ericka Alfaro [facility]   Showing today's visits and meeting all other requirements  Future Appointments  No visits were found meeting these conditions  Showing future appointments within next 150 days and meeting all other requirements       The patient was identified by name and date of birth  Stewart Vasquez was informed that this is a telemedicine visit and that the visit is being conducted through [facility] and patient was informed this is a secure, HIPAA-complaint platform  She agrees to proceed     My office door was closed  No one else was in the room  She acknowledged consent and understanding of privacy and security of the video platform  The patient has agreed to participate and understands they can discontinue the visit at any time  Patient is aware this is a billable service  Subjective  Stewart Vasquez is a 31 y o  female , please see note below         HPI     Past Medical History:   Diagnosis Date    Diabetes mellitus ([facility] [address] )     GERD (gastroesophageal reflux disease)     Obesity        Past Surgical History:   Procedure Laterality Date    TOOTH EXTRACTION Current Outpatient Medications   Medication Sig Dispense Refill    Blood Glucose Monitoring Suppl (OneTouch Verio Reflect) w/Device KIT Check blood sugars twice daily  Please substitute with appropriate alternative as covered by patient's insurance  Dx: E11 65 1 kit 0    glimepiride (AMARYL) 2 mg tablet One tablet in the morning with breakfast, and 1/2 tablet with dinner  45 tablet 5    glucose blood (OneTouch Verio) test strip Check blood sugars twice daily  Please substitute with appropriate alternative as covered by patient's insurance  Dx: E11 65 200 each 3    OneTouch Delica Lancets 71E MISC Check blood sugars twice daily  Please substitute with appropriate alternative as covered by patient's insurance  Dx: E11 65 200 each 3    escitalopram (LEXAPRO) 10 mg tablet Take 1 tablet (10 mg total) by mouth daily 30 tablet 5    levonorgestrel (MIRENA) 20 MCG/24HR IUD 1 each by Intrauterine route      Multiple Vitamin (MULTIVITAMIN PO) Take by mouth      Probiotic Product (PROBIOTIC PO) Take by mouth      rosuvastatin (CRESTOR) 5 mg tablet Take 1 tablet (5 mg total) by mouth daily 30 tablet 5     No current facility-administered medications for this visit  Allergies   Allergen Reactions    Shellfish-Derived Products - Food Allergy        Review of Systems    Video Exam    There were no vitals filed for this visit  Physical Exam     I spent 60 minutes directly with the patient during this visit    [id number] [facility] verbally agrees to participate in [employer]  Pt is aware that [employer] could be limited without vital signs or the ability to perform a full hands-on physical exam  Kacy Savage understands she or the provider may request at any time to terminate the video visit and request the patient to seek care or treatment in person      Medical Nutrition Therapy    Assessment    Visit Type: Initial visit    Chief complaint Monika Chu was recently diagnosed with type 2 diabetes, since diagnosis, she has cut out all sugary soda and is taking glimepiride as ordered  HPI: Lisseth diet history reveals erratic schedule  She works overnight 5 days weekly and then stays up during the day and sleeps at night on off days  Her carbohydrate intake is inconsistent, she consumes few vegetables or fruits, limited whole grains, and large portions of cheese  Currently meals range from 10 to 60 grams of carbohydrate  Explained basic pathophysiology of diabetes and impact of diet on blood glucose levels  Together we discussed what foods contain CHO, reading a food label, and serving sizes  Used the portion booklet to teach Angie Epperson more about food groups and basic carbohydrate counting  Created an individualized meal plan for Angie Epperson with 3 meals and 2-3 snacks providing 30-45 g carb per meal and 15 g carb per snack  This plan will help promote weight loss  Put together sample meals for Kacy's reference  Angie Epperson demonstrated good understanding, Angie Epperson will call with questions or for more education  Ht Readings from Last 1 Encounters:   04/13/22 5' 6" (1 676 m)     Wt Readings from Last 2 Encounters:   04/13/22 89 4 kg (197 lb 1 5 oz)   03/11/22 89 4 kg (197 lb)     Weight Change: Yes -20# (related to finding out about diabetes and other life/stress issues)    Medical Diagnosis/ICD10: E11 9    Barriers to Learning: no barriers    Do you follow any special diet presently?: Yes - stopped drinking regular soda and cut out some of the other added sugar and started watching portions better  Who shops: patient and spouse  Who cooks: patient and spouse, mostly , he will cook ahead and freeze meals    Food Log: Completed via the method of food recall, eats 2-3 meals per day    Breakfast:8am, something from [employer] or gets something at home    Morning Snack:may snack  Lunch:1:00  1:00, 2 cups Pasta w/ noodles, cream cheese , spinach sauce, chicken  Afternoon Snack: usually sleeps  Dinner:5-6 pm at home or [address] Pasta dish  Evening Snack:red beet eggs and 2-3 chunks of hard cheese or wheat thins w/ procsiutto wrapped mozzarella  Beverages: iced coffee w/ flavored creamer  Eating out/Take out:frequent take out from [facility] or grocery store  Exercise goes swimming most Thursdays OR walks a mile to [place], goes kayaking and hiking in the summer  Lots of walking around [employer] on work nights  Has an elliptical she can use in the winter  Calorie needs 1430 kcals/day Carbs: 30-45g/meal, 15g/snack         Nutrition Diagnosis:  Food and nutrition related knowledge deficit  related to Lack of prior exposure to accurate nutrition related information as evidenced by New medical diagnosis or change in existing diagnosis or condition    Intervention: plate method, carbohydrate counting, increased plant based foods, meal timing, meal planning, individualized meal plan, monitoring portion control, exercise guidelines and food diary     Treatment Goals: Patient understands education and recommendations, Patient will monitor portion control, Patient will increase their intake of plant based foods and Patient will count carbohydrates    Monitoring and evaluation:    Term code indicator  FH 1 6 3 Carbohydrate Intake Criteria: Follow meal plan  Term code indicator  CH 2 2 Treatments/Therapy/Alternative Medicine Criteria: Increase physical activity    Patients Response to Instruction:  Comprehensionvery good  Motivationvery good  Expected Compliancevery good    Thank you for coming to the [facility] for education today  Please feel free to call with any questions or concerns      Karina GarciaPalmyra AyersDavenport Center  [id number] [phone]

## 2022-04-22 NOTE — PATIENT INSTRUCTIONS
1  Follow basic meal plan guidelines of portions and timing between meals and snacks  2  Accumulate 150 minutes per week of intentional physical activity (not at work)  3  Keep a 3 day food diary before our next visit

## 2023-02-10 ENCOUNTER — APPOINTMENT (OUTPATIENT)
Dept: LAB | Facility: HOSPITAL | Age: 38
End: 2023-02-10

## 2023-02-10 ENCOUNTER — OFFICE VISIT (OUTPATIENT)
Dept: INTERNAL MEDICINE CLINIC | Facility: CLINIC | Age: 38
End: 2023-02-10

## 2023-02-10 VITALS
BODY MASS INDEX: 32.21 KG/M2 | WEIGHT: 200.4 LBS | SYSTOLIC BLOOD PRESSURE: 112 MMHG | OXYGEN SATURATION: 97 % | DIASTOLIC BLOOD PRESSURE: 80 MMHG | HEART RATE: 90 BPM | RESPIRATION RATE: 16 BRPM | HEIGHT: 66 IN

## 2023-02-10 DIAGNOSIS — E11.65 UNCONTROLLED TYPE 2 DIABETES MELLITUS WITH HYPERGLYCEMIA (HCC): Primary | ICD-10-CM

## 2023-02-10 DIAGNOSIS — E78.2 MIXED HYPERLIPIDEMIA: ICD-10-CM

## 2023-02-10 DIAGNOSIS — J30.9 ALLERGIC RHINITIS, UNSPECIFIED SEASONALITY, UNSPECIFIED TRIGGER: ICD-10-CM

## 2023-02-10 DIAGNOSIS — E66.09 CLASS 1 OBESITY DUE TO EXCESS CALORIES WITH SERIOUS COMORBIDITY AND BODY MASS INDEX (BMI) OF 32.0 TO 32.9 IN ADULT: ICD-10-CM

## 2023-02-10 DIAGNOSIS — E11.65 UNCONTROLLED TYPE 2 DIABETES MELLITUS WITH HYPERGLYCEMIA (HCC): ICD-10-CM

## 2023-02-10 LAB
25(OH)D3 SERPL-MCNC: 54.4 NG/ML (ref 30–100)
ALBUMIN SERPL BCP-MCNC: 3.4 G/DL (ref 3.5–5)
ALP SERPL-CCNC: 77 U/L (ref 46–116)
ALT SERPL W P-5'-P-CCNC: 42 U/L (ref 12–78)
ANION GAP SERPL CALCULATED.3IONS-SCNC: 9 MMOL/L (ref 4–13)
AST SERPL W P-5'-P-CCNC: 24 U/L (ref 5–45)
BILIRUB SERPL-MCNC: 0.64 MG/DL (ref 0.2–1)
BUN SERPL-MCNC: 10 MG/DL (ref 5–25)
CALCIUM ALBUM COR SERPL-MCNC: 9.1 MG/DL (ref 8.3–10.1)
CALCIUM SERPL-MCNC: 8.6 MG/DL (ref 8.3–10.1)
CHLORIDE SERPL-SCNC: 100 MMOL/L (ref 96–108)
CHOLEST SERPL-MCNC: 176 MG/DL
CO2 SERPL-SCNC: 26 MMOL/L (ref 21–32)
CREAT SERPL-MCNC: 0.77 MG/DL (ref 0.6–1.3)
GFR SERPL CREATININE-BSD FRML MDRD: 100 ML/MIN/1.73SQ M
GLUCOSE P FAST SERPL-MCNC: 300 MG/DL (ref 65–99)
HDLC SERPL-MCNC: 36 MG/DL
LDLC SERPL CALC-MCNC: 89 MG/DL (ref 0–100)
NONHDLC SERPL-MCNC: 140 MG/DL
POTASSIUM SERPL-SCNC: 3.8 MMOL/L (ref 3.5–5.3)
PROT SERPL-MCNC: 7.6 G/DL (ref 6.4–8.4)
SL AMB POCT HEMOGLOBIN AIC: 10.4 (ref ?–6.5)
SL AMB POCT UR MICROALBUMIN: ABNORMAL
SODIUM SERPL-SCNC: 135 MMOL/L (ref 135–147)
TRIGL SERPL-MCNC: 256 MG/DL

## 2023-02-10 RX ORDER — ROSUVASTATIN CALCIUM 5 MG/1
5 TABLET, COATED ORAL DAILY
Qty: 30 TABLET | Refills: 5 | Status: SHIPPED | OUTPATIENT
Start: 2023-02-10

## 2023-02-10 RX ORDER — GLIMEPIRIDE 2 MG/1
TABLET ORAL
Qty: 60 TABLET | Refills: 5 | Status: SHIPPED | OUTPATIENT
Start: 2023-02-10

## 2023-02-10 NOTE — PATIENT INSTRUCTIONS
Go and have your labs done today  I will discuss results with you when available  Increase your glimepiride to 2 mg in the morning with breakfast and 2 mg with dinner  Start taking your rosuvastatin regularly  Can stop the Lexapro  Do better at monitoring your sugars more regularly  Reduce intake of concentrated sweets, reduce portion sizes of carbohydrates  Follow up in 4 months,sooner as needed

## 2023-02-10 NOTE — PROGRESS NOTES
Assessment/Plan:   Patient Instructions   Go and have your labs done today  I will discuss results with you when available  Increase your glimepiride to 2 mg in the morning with breakfast and 2 mg with dinner  Start taking your rosuvastatin regularly  Can stop the Lexapro  Do better at monitoring your sugars more regularly  Reduce intake of concentrated sweets, reduce portion sizes of carbohydrates  Follow up in 4 months,sooner as needed  Quality Measures: BMI Counseling: Body mass index is 32 35 kg/m²  The BMI is above normal  Nutrition recommendations include decreasing portion sizes, encouraging healthy choices of fruits and vegetables, decreasing fast food intake, consuming healthier snacks, moderation in carbohydrate intake and reducing intake of cholesterol  Exercise recommendations include exercising 3-5 times per week  Rationale for BMI follow-up plan is due to patient being overweight or obese  Return in about 4 months (around 6/10/2023) for Next scheduled follow up  Diagnoses and all orders for this visit:    Uncontrolled type 2 diabetes mellitus with hyperglycemia ([facility] [address] )  -     POCT urine microalbumin/creatinine  -     POCT hemoglobin A1c  -     glimepiride (AMARYL) 2 mg tablet; One tablet in the morning with breakfast, and 1 tablet with dinner  Mixed hyperlipidemia  -     rosuvastatin (CRESTOR) 5 mg tablet; Take 1 tablet (5 mg total) by mouth daily    Class 1 obesity due to excess calories with serious comorbidity and body mass index (BMI) of 32 0 to 32 9 in adult        Subjective:      Patient ID: Erika Xiong is a 30 y o  female  Follow-up, patient did not do her labs for this visit  Uncontrolled type 2 diabetes: A1c done in the office today is 10 4  Previous A1c was 9 5  Despite this patient denies polyuria, polyphagia, polydipsia  She states she is fasting so therefore will have her labs done after this visit, I will then discuss them with her    Meantime we will increase her glimepiride from 2 mg in the morning and 1 mg in the evening to 2 mg twice daily  She was intolerant to metformin causing diarrhea  Patient admits noncompliance  Drinks sugary sodas  Despite having been sent to a dietitian she does not follow anything she learned  She admits to eating fast food almost continuously  States she does not like to cook  We discussed ordering healthy meals online, but she is not interested because for the most part she would have to prepare them  I did tell her that some do come fully prepared, and only need to be reheated  Portion sizes discussed  Reduction in intake in sweets and carbohydrates discussed  Exercise discussed  We are going to see if we can get a continuous glucose monitoring system for this patient  Hyperlipidemia: Patient had been placed on rosuvastatin  She stopped taking after the prescription ran out, did not notify us  She believes she has been off the medication for about 1 month  States she is not taking the antidepressant as she feels it did not help  She is significantly affected by the relationship with her  and finds it difficult to move on in any direction secondary to this  She states she has been through counseling, couples counseling recommended however her  refuses  Vitamin D deficiency: Patient does states she is taking her vitamin D on a regular basis  ALLERGIES:  Allergies   Allergen Reactions   • Shellfish-Derived Products - Food Allergy        CURRENT MEDICATIONS:    Current Outpatient Medications:   •  Blood Glucose Monitoring Suppl (OneTouch Verio Reflect) w/Device KIT, Check blood sugars twice daily  Please substitute with appropriate alternative as covered by patient's insurance   Dx: E11 65, Disp: 1 kit, Rfl: 0  •  glimepiride (AMARYL) 2 mg tablet, One tablet in the morning with breakfast, and 1 tablet with dinner , Disp: 60 tablet, Rfl: 5  •  glucose blood (OneTouch Verio) test strip, Check blood sugars twice daily  Please substitute with appropriate alternative as covered by patient's insurance  Dx: E11 65, Disp: 200 each, Rfl: 3  •  levonorgestrel (MIRENA) 20 MCG/24HR IUD, 1 each by Intrauterine route, Disp: , Rfl:   •  Multiple Vitamin (MULTIVITAMIN PO), Take by mouth, Disp: , Rfl:   •  OneTouch Delica Lancets 54G MISC, Check blood sugars twice daily  Please substitute with appropriate alternative as covered by patient's insurance   Dx: E11 65, Disp: 200 each, Rfl: 3  •  Probiotic Product (PROBIOTIC PO), Take by mouth, Disp: , Rfl:   •  rosuvastatin (CRESTOR) 5 mg tablet, Take 1 tablet (5 mg total) by mouth daily, Disp: 30 tablet, Rfl: 5    ACTIVE PROBLEM LIST:  Patient Active Problem List   Diagnosis   • Ulnar neuropathy at elbow, left   • Mixed hyperlipidemia   • Gastroesophageal reflux disease   • Class 1 obesity due to excess calories with serious comorbidity and body mass index (BMI) of 32 0 to 32 9 in adult   • Uncontrolled type 2 diabetes mellitus with hyperglycemia (HCC)   • Depression, recurrent (HCC)   • Vitamin D deficiency   • Anxiety       PAST MEDICAL HISTORY:  Past Medical History:   Diagnosis Date   • Diabetes mellitus ([facility] [address] )    • GERD (gastroesophageal reflux disease)    • Obesity        PAST SURGICAL HISTORY:  Past Surgical History:   Procedure Laterality Date   • TOOTH EXTRACTION         FAMILY HISTORY:  Family History   Problem Relation Age of Onset   • Diabetes Mother    • Thyroid disease Mother    • Diabetes Father    • Venous thrombosis Maternal Grandfather        SOCIAL HISTORY:  Social History     Socioeconomic History   • Marital status: /Civil Union     Spouse name: Not on file   • Number of children: Not on file   • Years of education: Not on file   • Highest education level: Not on file   Occupational History   • Not on file   Tobacco Use   • Smoking status: Never   • Smokeless tobacco: Never   Vaping Use   • Vaping Use: Never used   Substance and Sexual Activity   • Alcohol use: Yes     Comment: hard cider on occasion   • Drug use: Never   • Sexual activity: Yes     Partners: Male   Other Topics Concern   • Not on file   Social History Narrative        1 child    Working-[employer] nights    Hobbies-drawing    Exercise-running, biking    Reg dental care, brush twice daily     Social Determinants of Health     Financial Resource Strain: Not on file   Food Insecurity: Not on file   Transportation Needs: Not on file   Physical Activity: Not on file   Stress: Not on file   Social Connections: Not on file   Intimate Partner Violence: Not on file   Housing Stability: Not on file       Review of Systems   Constitutional: Negative for activity change, chills, fatigue and fever  HENT: Negative for congestion  Eyes: Negative for discharge  Respiratory: Negative for cough, chest tightness and shortness of breath  Cardiovascular: Negative for chest pain, palpitations and leg swelling  Gastrointestinal: Negative for abdominal pain, blood in stool, constipation, diarrhea, nausea and vomiting  Endocrine: Negative for polydipsia, polyphagia and polyuria  Genitourinary: Negative for difficulty urinating  Musculoskeletal: Negative for arthralgias and myalgias  Skin: Negative for rash  Allergic/Immunologic: Negative for immunocompromised state  Neurological: Negative for dizziness, syncope, weakness, light-headedness and headaches  Hematological: Negative for adenopathy  Does not bruise/bleed easily  Psychiatric/Behavioral: Negative for dysphoric mood, sleep disturbance and suicidal ideas  The patient is not nervous/anxious  Objective:  Vitals:    02/10/23 1023   BP: 112/80   BP Location: Left arm   Patient Position: Sitting   Cuff Size: Adult   Pulse: 90   Resp: 16   SpO2: 97%   Weight: 90 9 kg (200 lb 6 4 oz)   Height: 5' 6" (1 676 m)     Body mass index is 32 35 kg/m²  Physical Exam  Vitals and nursing note reviewed     Constitutional: General: She is not in acute distress  Appearance: She is well-developed  She is obese  HENT:      Head: Normocephalic and atraumatic  Eyes:      Extraocular Movements: Extraocular movements intact  Pupils: Pupils are equal, round, and reactive to light  Neck:      Thyroid: No thyromegaly  Vascular: No carotid bruit or JVD  Cardiovascular:      Rate and Rhythm: Normal rate and regular rhythm  Heart sounds: Normal heart sounds  Pulmonary:      Effort: Pulmonary effort is normal  No respiratory distress  Breath sounds: Normal breath sounds  Musculoskeletal:      Cervical back: Neck supple  Right lower leg: No edema  Left lower leg: No edema  Lymphadenopathy:      Cervical: No cervical adenopathy  Skin:     General: Skin is warm and dry  Findings: No rash  Neurological:      General: No focal deficit present  Mental Status: She is alert and oriented to person, place, and time  Mental status is at baseline  Psychiatric:         Mood and Affect: Mood normal          Behavior: Behavior normal            RESULTS:    Recent Results (from the past 1008 hour(s))   POCT hemoglobin A1c    Collection Time: 02/10/23 10:38 AM   Result Value Ref Range    Hemoglobin A1C 10 4 (A) 6 5   POCT urine microalbumin/creatinine    Collection Time: 02/10/23 11:06 AM   Result Value Ref Range    UR MICROALBUMIN 80 mg/L        This note was created with voice recognition software  Phonic, grammatical and spelling errors may be present within the note as a result

## 2023-02-11 LAB
EST. AVERAGE GLUCOSE BLD GHB EST-MCNC: 240 MG/DL
HBA1C MFR BLD: 10 %

## 2023-02-14 LAB
DME PARACHUTE DELIVERY DATE REQUESTED: NORMAL
DME PARACHUTE ITEM DESCRIPTION: NORMAL
DME PARACHUTE ITEM DESCRIPTION: NORMAL
DME PARACHUTE ORDER STATUS: NORMAL
DME PARACHUTE SUPPLIER NAME: NORMAL
DME PARACHUTE SUPPLIER PHONE: NORMAL

## 2023-02-15 DIAGNOSIS — E11.65 UNCONTROLLED TYPE 2 DIABETES MELLITUS WITH HYPERGLYCEMIA (HCC): Primary | ICD-10-CM

## 2023-02-15 DIAGNOSIS — E78.2 MIXED HYPERLIPIDEMIA: ICD-10-CM

## 2023-06-05 ENCOUNTER — APPOINTMENT (OUTPATIENT)
Dept: LAB | Facility: HOSPITAL | Age: 38
End: 2023-06-05
Payer: COMMERCIAL

## 2023-06-05 DIAGNOSIS — E11.65 UNCONTROLLED TYPE 2 DIABETES MELLITUS WITH HYPERGLYCEMIA (HCC): ICD-10-CM

## 2023-06-05 DIAGNOSIS — E78.2 MIXED HYPERLIPIDEMIA: ICD-10-CM

## 2023-06-05 LAB
ALBUMIN SERPL BCP-MCNC: 4.4 G/DL (ref 3.5–5)
ALP SERPL-CCNC: 55 U/L (ref 34–104)
ALT SERPL W P-5'-P-CCNC: 50 U/L (ref 7–52)
ANION GAP SERPL CALCULATED.3IONS-SCNC: 5 MMOL/L (ref 4–13)
AST SERPL W P-5'-P-CCNC: 24 U/L (ref 13–39)
BASOPHILS # BLD AUTO: 0.03 THOUSANDS/ÂΜL (ref 0–0.1)
BASOPHILS NFR BLD AUTO: 1 % (ref 0–1)
BILIRUB SERPL-MCNC: 0.63 MG/DL (ref 0.2–1)
BUN SERPL-MCNC: 12 MG/DL (ref 5–25)
CALCIUM SERPL-MCNC: 9.1 MG/DL (ref 8.4–10.2)
CHLORIDE SERPL-SCNC: 101 MMOL/L (ref 96–108)
CHOLEST SERPL-MCNC: 164 MG/DL
CO2 SERPL-SCNC: 29 MMOL/L (ref 21–32)
CREAT SERPL-MCNC: 0.7 MG/DL (ref 0.6–1.3)
CREAT UR-MCNC: 78.6 MG/DL
EOSINOPHIL # BLD AUTO: 0.16 THOUSAND/ÂΜL (ref 0–0.61)
EOSINOPHIL NFR BLD AUTO: 3 % (ref 0–6)
ERYTHROCYTE [DISTWIDTH] IN BLOOD BY AUTOMATED COUNT: 12 % (ref 11.6–15.1)
EST. AVERAGE GLUCOSE BLD GHB EST-MCNC: 252 MG/DL
GFR SERPL CREATININE-BSD FRML MDRD: 112 ML/MIN/1.73SQ M
GLUCOSE P FAST SERPL-MCNC: 285 MG/DL (ref 65–99)
HBA1C MFR BLD: 10.4 %
HCT VFR BLD AUTO: 45.1 % (ref 34.8–46.1)
HDLC SERPL-MCNC: 43 MG/DL
HGB BLD-MCNC: 15.2 G/DL (ref 11.5–15.4)
IMM GRANULOCYTES # BLD AUTO: 0.01 THOUSAND/UL (ref 0–0.2)
IMM GRANULOCYTES NFR BLD AUTO: 0 % (ref 0–2)
LDLC SERPL CALC-MCNC: 93 MG/DL (ref 0–100)
LYMPHOCYTES # BLD AUTO: 1.88 THOUSANDS/ÂΜL (ref 0.6–4.47)
LYMPHOCYTES NFR BLD AUTO: 33 % (ref 14–44)
MCH RBC QN AUTO: 29 PG (ref 26.8–34.3)
MCHC RBC AUTO-ENTMCNC: 33.7 G/DL (ref 31.4–37.4)
MCV RBC AUTO: 86 FL (ref 82–98)
MICROALBUMIN UR-MCNC: 13.3 MG/L (ref 0–20)
MICROALBUMIN/CREAT 24H UR: 17 MG/G CREATININE (ref 0–30)
MONOCYTES # BLD AUTO: 0.25 THOUSAND/ÂΜL (ref 0.17–1.22)
MONOCYTES NFR BLD AUTO: 4 % (ref 4–12)
NEUTROPHILS # BLD AUTO: 3.46 THOUSANDS/ÂΜL (ref 1.85–7.62)
NEUTS SEG NFR BLD AUTO: 59 % (ref 43–75)
NONHDLC SERPL-MCNC: 121 MG/DL
NRBC BLD AUTO-RTO: 0 /100 WBCS
PLATELET # BLD AUTO: 207 THOUSANDS/UL (ref 149–390)
PMV BLD AUTO: 9.8 FL (ref 8.9–12.7)
POTASSIUM SERPL-SCNC: 4 MMOL/L (ref 3.5–5.3)
PROT SERPL-MCNC: 7.5 G/DL (ref 6.4–8.4)
RBC # BLD AUTO: 5.25 MILLION/UL (ref 3.81–5.12)
SODIUM SERPL-SCNC: 135 MMOL/L (ref 135–147)
TRIGL SERPL-MCNC: 142 MG/DL
WBC # BLD AUTO: 5.79 THOUSAND/UL (ref 4.31–10.16)

## 2023-06-05 PROCEDURE — 83036 HEMOGLOBIN GLYCOSYLATED A1C: CPT

## 2023-06-05 PROCEDURE — 80053 COMPREHEN METABOLIC PANEL: CPT

## 2023-06-05 PROCEDURE — 80061 LIPID PANEL: CPT

## 2023-06-05 PROCEDURE — 36415 COLL VENOUS BLD VENIPUNCTURE: CPT

## 2023-06-05 PROCEDURE — 85025 COMPLETE CBC W/AUTO DIFF WBC: CPT

## 2023-06-09 ENCOUNTER — OFFICE VISIT (OUTPATIENT)
Dept: INTERNAL MEDICINE CLINIC | Facility: CLINIC | Age: 38
End: 2023-06-09
Payer: COMMERCIAL

## 2023-06-09 VITALS
BODY MASS INDEX: 32.95 KG/M2 | HEART RATE: 77 BPM | HEIGHT: 66 IN | SYSTOLIC BLOOD PRESSURE: 118 MMHG | RESPIRATION RATE: 16 BRPM | DIASTOLIC BLOOD PRESSURE: 80 MMHG | WEIGHT: 205 LBS | OXYGEN SATURATION: 98 %

## 2023-06-09 DIAGNOSIS — F33.9 DEPRESSION, RECURRENT (HCC): ICD-10-CM

## 2023-06-09 DIAGNOSIS — E11.65 UNCONTROLLED TYPE 2 DIABETES MELLITUS WITH HYPERGLYCEMIA (HCC): Primary | ICD-10-CM

## 2023-06-09 DIAGNOSIS — E55.9 VITAMIN D DEFICIENCY: ICD-10-CM

## 2023-06-09 DIAGNOSIS — E78.2 MIXED HYPERLIPIDEMIA: ICD-10-CM

## 2023-06-09 PROCEDURE — 99214 OFFICE O/P EST MOD 30 MIN: CPT | Performed by: PHYSICIAN ASSISTANT

## 2023-06-09 NOTE — PATIENT INSTRUCTIONS
Continue current medications with the addition of Jardiance 10 mg 1 daily  We did discuss potential side effects  If any of them occur please get in contact with me  Would like you to return in 2 months to have a repeat A1c done here in the office  Please try harder to monitor blood sugars at home on a more regular basis

## 2023-06-09 NOTE — PROGRESS NOTES
Assessment/Plan:   Patient Instructions   Continue current medications with the addition of Jardiance 10 mg 1 daily  We did discuss potential side effects  If any of them occur please get in contact with me  Would like you to return in 2 months to have a repeat A1c done here in the office  Please try harder to monitor blood sugars at home on a more regular basis  Quality Measures:       Return in about 2 months (around 8/9/2023) for Next scheduled follow up  Diagnoses and all orders for this visit:    Uncontrolled type 2 diabetes mellitus with hyperglycemia ([facility] [address] )  -     Cancel: Albumin / creatinine urine ratio; Future  -     Comprehensive metabolic panel; Future  -     Hemoglobin A1C; Future  -     CBC and differential; Future  -     Empagliflozin (JARDIANCE) 10 MG TABS tablet; Take 1 tablet (10 mg total) by mouth daily    Mixed hyperlipidemia  -     Lipid Panel with Direct LDL reflex; Future    Depression, recurrent (HCC)    Vitamin D deficiency  -     Vitamin D 25 hydroxy; Future          Subjective:      Patient ID: Lincoln Palacio is a 30 y o  female  Follow-up, labs reviewed with patient    2 diabetes uncontrolled  Fasting blood sugar 285 with A1c 10 4 which is actually worse from last visit when it was 10 0  Admits to polyuria  No polyphagia or polydipsia  Does not monitor her blood sugars at home despite being encouraged to do so  Diet high in carbohydrates despite patient understanding that this is not good for her  She states she has in the past been instructed on diabetic diet  Does not want to go to diabetic education  Does not want referral to endocrinology at this point  States she is taking her glimepiride  We had tried metformin in the past however patient had severe diarrhea from even metformin ER  Discussed addition of medication such as Jardiance and she is in agreement  Potential side effects discussed    Patient will keep me informed if she develops any     Hyperlipidemia: On low-dose rosuvastatin  At goal but did discuss possibly increasing the medication at next visit for better control  Depression: Not on any medication, not wanting any medication  ALLERGIES:  Allergies   Allergen Reactions   • Shellfish-Derived Products - Food Allergy        CURRENT MEDICATIONS:    Current Outpatient Medications:   •  Blood Glucose Monitoring Suppl (OneTouch Verio Reflect) w/Device KIT, Check blood sugars twice daily  Please substitute with appropriate alternative as covered by patient's insurance  Dx: E11 65, Disp: 1 kit, Rfl: 0  •  Empagliflozin (JARDIANCE) 10 MG TABS tablet, Take 1 tablet (10 mg total) by mouth daily, Disp: 30 tablet, Rfl: 5  •  glimepiride (AMARYL) 2 mg tablet, One tablet in the morning with breakfast, and 1 tablet with dinner , Disp: 60 tablet, Rfl: 5  •  glucose blood (OneTouch Verio) test strip, Check blood sugars twice daily  Please substitute with appropriate alternative as covered by patient's insurance  Dx: E11 65, Disp: 200 each, Rfl: 3  •  levonorgestrel (MIRENA) 20 MCG/24HR IUD, 1 each by Intrauterine route, Disp: , Rfl:   •  Multiple Vitamin (MULTIVITAMIN PO), Take by mouth, Disp: , Rfl:   •  OneTouch Delica Lancets 42E MISC, Check blood sugars twice daily  Please substitute with appropriate alternative as covered by patient's insurance   Dx: E11 65, Disp: 200 each, Rfl: 3  •  Probiotic Product (PROBIOTIC PO), Take by mouth, Disp: , Rfl:   •  rosuvastatin (CRESTOR) 5 mg tablet, Take 1 tablet (5 mg total) by mouth daily, Disp: 30 tablet, Rfl: 5    ACTIVE PROBLEM LIST:  Patient Active Problem List   Diagnosis   • Ulnar neuropathy at elbow, left   • Mixed hyperlipidemia   • Gastroesophageal reflux disease   • Class 1 obesity due to excess calories with serious comorbidity and body mass index (BMI) of 32 0 to 32 9 in adult   • Uncontrolled type 2 diabetes mellitus with hyperglycemia (HCC)   • Depression, recurrent ([facility] [address] )   • Vitamin D deficiency   • Anxiety       PAST MEDICAL HISTORY:  Past Medical History:   Diagnosis Date   • Diabetes mellitus ([facility] [address] )    • GERD (gastroesophageal reflux disease)    • Obesity        PAST SURGICAL HISTORY:  Past Surgical History:   Procedure Laterality Date   • TOOTH EXTRACTION         FAMILY HISTORY:  Family History   Problem Relation Age of Onset   • Diabetes Mother    • Thyroid disease Mother    • Diabetes Father    • Venous thrombosis Maternal Grandfather        SOCIAL HISTORY:  Social History     Socioeconomic History   • Marital status: /Civil Union     Spouse name: Not on file   • Number of children: Not on file   • Years of education: Not on file   • Highest education level: Not on file   Occupational History   • Not on file   Tobacco Use   • Smoking status: Never   • Smokeless tobacco: Never   Vaping Use   • Vaping Use: Never used   Substance and Sexual Activity   • Alcohol use: Yes     Comment: hard cider on occasion   • Drug use: Never   • Sexual activity: Yes     Partners: Male   Other Topics Concern   • Not on file   Social History Narrative        1 child    Working-[employer] nights    Hobbies-drawing    Exercise-running, biking    Reg dental care, brush twice daily     Social Determinants of Health     Financial Resource Strain: Not on file   Food Insecurity: Not on file   Transportation Needs: Not on file   Physical Activity: Not on file   Stress: Not on file   Social Connections: Not on file   Intimate Partner Violence: Not on file   Housing Stability: Not on file       Review of Systems   Constitutional: Positive for fatigue  Negative for activity change, chills and fever  HENT: Negative for congestion  Eyes: Positive for visual disturbance (Wears glasses)  Negative for discharge  Respiratory: Negative for cough, chest tightness and shortness of breath  Cardiovascular: Negative for chest pain, palpitations and leg swelling     Gastrointestinal: Negative for abdominal pain, "blood in stool, constipation, diarrhea, nausea and vomiting  Endocrine: Positive for polyuria  Negative for polydipsia and polyphagia  Genitourinary: Negative for difficulty urinating  Musculoskeletal: Negative for arthralgias and myalgias  Skin: Negative for rash  Allergic/Immunologic: Negative for immunocompromised state  Neurological: Negative for dizziness, syncope, weakness, light-headedness and headaches  Hematological: Negative for adenopathy  Does not bruise/bleed easily  Psychiatric/Behavioral: Negative for dysphoric mood, sleep disturbance and suicidal ideas  The patient is not nervous/anxious  Objective:  Vitals:    06/09/23 0902   BP: 118/80   BP Location: Left arm   Patient Position: Sitting   Pulse: 77   Resp: 16   SpO2: 98%   Weight: 93 kg (205 lb)   Height: 5' 6\" (1 676 m)     Body mass index is 33 09 kg/m²  Physical Exam  Vitals and nursing note reviewed  Constitutional:       General: She is not in acute distress  Appearance: She is well-developed  She is obese  HENT:      Head: Normocephalic and atraumatic  Eyes:      Extraocular Movements: Extraocular movements intact  Pupils: Pupils are equal, round, and reactive to light  Neck:      Thyroid: No thyromegaly  Vascular: No carotid bruit or JVD  Cardiovascular:      Rate and Rhythm: Normal rate and regular rhythm  Heart sounds: Normal heart sounds  Pulmonary:      Effort: Pulmonary effort is normal  No respiratory distress  Breath sounds: Normal breath sounds  Musculoskeletal:      Cervical back: Neck supple  Right lower leg: No edema  Left lower leg: No edema  Lymphadenopathy:      Cervical: No cervical adenopathy  Skin:     General: Skin is warm and dry  Findings: No rash  Neurological:      General: No focal deficit present  Mental Status: She is alert and oriented to person, place, and time  Mental status is at baseline     Psychiatric:         Mood and " Affect: Mood normal          Behavior: Behavior normal            RESULTS:    In chart    This note was created with voice recognition software  Phonic, grammatical and spelling errors may be present within the note as a result

## 2023-07-20 DIAGNOSIS — E11.65 UNCONTROLLED TYPE 2 DIABETES MELLITUS WITH HYPERGLYCEMIA (HCC): ICD-10-CM

## 2023-07-20 RX ORDER — GLIMEPIRIDE 2 MG/1
TABLET ORAL
Qty: 60 TABLET | Refills: 0 | Status: SHIPPED | OUTPATIENT
Start: 2023-07-20

## 2023-08-04 DIAGNOSIS — E78.2 MIXED HYPERLIPIDEMIA: ICD-10-CM

## 2023-08-04 RX ORDER — ROSUVASTATIN CALCIUM 5 MG/1
5 TABLET, COATED ORAL DAILY
Qty: 30 TABLET | Refills: 5 | Status: SHIPPED | OUTPATIENT
Start: 2023-08-04

## 2023-08-25 ENCOUNTER — OFFICE VISIT (OUTPATIENT)
Dept: INTERNAL MEDICINE CLINIC | Facility: CLINIC | Age: 38
End: 2023-08-25
Payer: COMMERCIAL

## 2023-08-25 VITALS
HEIGHT: 66 IN | DIASTOLIC BLOOD PRESSURE: 90 MMHG | OXYGEN SATURATION: 96 % | WEIGHT: 202 LBS | RESPIRATION RATE: 14 BRPM | HEART RATE: 72 BPM | BODY MASS INDEX: 32.47 KG/M2 | SYSTOLIC BLOOD PRESSURE: 124 MMHG

## 2023-08-25 DIAGNOSIS — E11.65 UNCONTROLLED TYPE 2 DIABETES MELLITUS WITH HYPERGLYCEMIA (HCC): Primary | ICD-10-CM

## 2023-08-25 LAB — SL AMB POCT HEMOGLOBIN AIC: 7.7 (ref ?–6.5)

## 2023-08-25 PROCEDURE — 99213 OFFICE O/P EST LOW 20 MIN: CPT | Performed by: PHYSICIAN ASSISTANT

## 2023-08-25 PROCEDURE — 83036 HEMOGLOBIN GLYCOSYLATED A1C: CPT | Performed by: PHYSICIAN ASSISTANT

## 2023-08-25 NOTE — PATIENT INSTRUCTIONS
With significant improvement in your A1c, we will continue the glimepiride at 2 mg twice a day but increase the dose of Jardiance to 25 mg daily. Continue other medications. Schedule follow-up in 4 months, have lab work that is in the EMR done for that visit, follow-up sooner as needed.

## 2023-08-25 NOTE — PROGRESS NOTES
Assessment/Plan:   Patient Instructions   With significant improvement in your A1c, we will continue the glimepiride at 2 mg twice a day but increase the dose of Jardiance to 25 mg daily. Continue other medications. Schedule follow-up in 4 months, have lab work that is in the EMR done for that visit, follow-up sooner as needed. Quality Measures:       Return in about 4 months (around 12/25/2023) for Next scheduled follow up. Diagnoses and all orders for this visit:    Uncontrolled type 2 diabetes mellitus with hyperglycemia (HCC)  -     POCT hemoglobin A1c  -     Empagliflozin 25 MG TABS; Take 1 tablet (25 mg total) by mouth daily          Subjective:      Patient ID: Sheryl Wahl is a 30 y.o. female. 2-month follow-up of uncontrolled diabetes mellitus after starting Jardiance 10 mg daily. Patient reports at first when starting the medication she was urinating frequently, did not have difficulty with any yeast infection. She has tried very hard to stay hydrated, no longer drinking soda, is trying hard to modify her diet to reduce carbohydrate intake. Weight down 2 pounds. A1c done in the office today is down to 7.7. This is a significant improvement in that her last A1c was 10.4. She at this point is denying polyuria which she was having in the past, denies polyphagia, polydipsia. Vision acceptable. No chest pain or palpitations. Today no other focal concerns. After discussion she is willing to increase the Jardiance to 25 mg daily. We have labs scheduled to repeat in 4 months with follow-up visit. ALLERGIES:  Allergies   Allergen Reactions   • Shellfish-Derived Products - Food Allergy        CURRENT MEDICATIONS:    Current Outpatient Medications:   •  Blood Glucose Monitoring Suppl (OneTouch Verio Reflect) w/Device KIT, Check blood sugars twice daily. Please substitute with appropriate alternative as covered by patient's insurance.  Dx: E11.65, Disp: 1 kit, Rfl: 0  • Empagliflozin 25 MG TABS, Take 1 tablet (25 mg total) by mouth daily, Disp: 30 tablet, Rfl: 5  •  glimepiride (AMARYL) 2 mg tablet, TAKE 1 TABLET BY MOUTH IN THE MORNING WITH BREAKFAST AND 1 WITH SUPPER, Disp: 60 tablet, Rfl: 0  •  glucose blood (OneTouch Verio) test strip, Check blood sugars twice daily. Please substitute with appropriate alternative as covered by patient's insurance. Dx: E11.65, Disp: 200 each, Rfl: 3  •  levonorgestrel (MIRENA) 20 MCG/24HR IUD, 1 each by Intrauterine route, Disp: , Rfl:   •  Multiple Vitamin (MULTIVITAMIN PO), Take by mouth, Disp: , Rfl:   •  OneTouch Delica Lancets 50K MISC, Check blood sugars twice daily. Please substitute with appropriate alternative as covered by patient's insurance.  Dx: E11.65, Disp: 200 each, Rfl: 3  •  Probiotic Product (PROBIOTIC PO), Take by mouth, Disp: , Rfl:   •  rosuvastatin (CRESTOR) 5 mg tablet, Take 1 tablet by mouth once daily, Disp: 30 tablet, Rfl: 5    ACTIVE PROBLEM LIST:  Patient Active Problem List   Diagnosis   • Ulnar neuropathy at elbow, left   • Mixed hyperlipidemia   • Gastroesophageal reflux disease   • Class 1 obesity due to excess calories with serious comorbidity and body mass index (BMI) of 32.0 to 32.9 in adult   • Uncontrolled type 2 diabetes mellitus with hyperglycemia (HCC)   • Depression, recurrent (HCC)   • Vitamin D deficiency   • Anxiety       PAST MEDICAL HISTORY:  Past Medical History:   Diagnosis Date   • Diabetes mellitus ([address])    • GERD (gastroesophageal reflux disease)    • Obesity        PAST SURGICAL HISTORY:  Past Surgical History:   Procedure Laterality Date   • TOOTH EXTRACTION         FAMILY HISTORY:  Family History   Problem Relation Age of Onset   • Diabetes Mother    • Thyroid disease Mother    • Diabetes Father    • Venous thrombosis Maternal Grandfather        SOCIAL HISTORY:  Social History     Socioeconomic History   • Marital status: /Civil Union     Spouse name: Not on file   • Number of children: Not on file   • Years of education: Not on file   • Highest education level: Not on file   Occupational History   • Not on file   Tobacco Use   • Smoking status: Never   • Smokeless tobacco: Never   Vaping Use   • Vaping Use: Never used   Substance and Sexual Activity   • Alcohol use: Yes     Comment: hard cider on occasion   • Drug use: Never   • Sexual activity: Yes     Partners: Male   Other Topics Concern   • Not on file   Social History Narrative        1 child    Working-[employer] nights    Hobbies-drawing    Exercise-running, biking    Reg dental care, brush twice daily     Social Determinants of Health     Financial Resource Strain: Not on file   Food Insecurity: Not on file   Transportation Needs: Not on file   Physical Activity: Not on file   Stress: Not on file   Social Connections: Not on file   Intimate Partner Violence: Not on file   Housing Stability: Not on file       Review of Systems   Constitutional: Negative for activity change, chills, fatigue and fever. HENT: Negative for congestion. Eyes: Negative for discharge. Respiratory: Negative for cough, chest tightness and shortness of breath. Cardiovascular: Negative for chest pain, palpitations and leg swelling. Gastrointestinal: Negative for abdominal pain. Endocrine: Negative for polydipsia, polyphagia and polyuria. Genitourinary: Negative for difficulty urinating. Musculoskeletal: Negative for arthralgias and myalgias. Skin: Negative for rash. Allergic/Immunologic: Negative for immunocompromised state. Neurological: Negative for dizziness, syncope, weakness, light-headedness and headaches. Hematological: Negative for adenopathy. Does not bruise/bleed easily. Psychiatric/Behavioral: Negative for dysphoric mood. The patient is not nervous/anxious.           Objective:  Vitals:    08/25/23 0933   BP: 124/90   BP Location: Left arm   Patient Position: Sitting   Pulse: 72   Resp: 14   SpO2: 96%   Weight: 91.6 kg (202 lb)   Height: 5' 6" (1.676 m)     Body mass index is 32.6 kg/m². Physical Exam  Vitals and nursing note reviewed. Constitutional:       General: She is not in acute distress. Appearance: She is well-developed. HENT:      Head: Normocephalic and atraumatic. Eyes:      Extraocular Movements: Extraocular movements intact. Pupils: Pupils are equal, round, and reactive to light. Neck:      Thyroid: No thyromegaly. Vascular: No carotid bruit or JVD. Cardiovascular:      Rate and Rhythm: Normal rate and regular rhythm. Heart sounds: Normal heart sounds. Pulmonary:      Effort: Pulmonary effort is normal. No respiratory distress. Breath sounds: Normal breath sounds. Musculoskeletal:      Cervical back: Neck supple. Right lower leg: No edema. Left lower leg: No edema. Lymphadenopathy:      Cervical: No cervical adenopathy. Skin:     General: Skin is warm and dry. Findings: No rash. Neurological:      General: No focal deficit present. Mental Status: She is alert and oriented to person, place, and time. Mental status is at baseline. Psychiatric:         Mood and Affect: Mood normal.         Behavior: Behavior normal.           RESULTS:  Hemoglobin A1C   Date/Time Value Ref Range Status   08/25/2023 09:45 AM 7.7 (A) 6.5 Final   06/05/2023 09:42 AM 10.4 (H) Normal 3.8-5.6%; PreDiabetic 5.7-6.4%;  Diabetic >=6.5%; Glycemic control for adults with diabetes <7.0% % Final     Cholesterol   Date/Time Value Ref Range Status   06/05/2023 09:42  See Comment mg/dL Final     Comment:     Cholesterol:         Pediatric <18 Years        Desirable          <170 mg/dL      Borderline High    170-199 mg/dL      High               >=200 mg/dL        Adult >=18 Years            Desirable        <200 mg/dL      Borderline High  200-239 mg/dL      High             >239 mg/dL       Triglycerides   Date/Time Value Ref Range Status   06/05/2023 09:42  See Comment mg/dL Final     Comment:     Triglyceride:     0-9Y            <75mg/dL     10Y-17Y         <90 mg/dL       >=18Y     Normal          <150 mg/dL     Borderline High 150-199 mg/dL     High            200-499 mg/dL        Very High       >499 mg/dL    Specimen collection should occur prior to Metamizole administration due to the potential for falsely depressed results. HDL, Direct   Date/Time Value Ref Range Status   06/05/2023 09:42 AM 43 (L) >=50 mg/dL Final     LDL Calculated   Date/Time Value Ref Range Status   06/05/2023 09:42 AM 93 0 - 100 mg/dL Final     Comment:     LDL Cholesterol:     Optimal           <100 mg/dl     Near Optimal      100-129 mg/dl     Above Optimal       Borderline High 130-159 mg/dl       High            160-189 mg/dl       Very High       >189 mg/dl         This screening LDL is a calculated result. It does not have the accuracy of the Direct Measured LDL in the monitoring of patients with hyperlipidemia and/or statin therapy. Direct Measure LDL (GXP488) must be ordered separately in these patients. Hemoglobin   Date/Time Value Ref Range Status   06/05/2023 09:42 AM 15.2 11.5 - 15.4 g/dL Final     Hematocrit   Date/Time Value Ref Range Status   06/05/2023 09:42 AM 45.1 34.8 - 46.1 % Final     Platelets   Date/Time Value Ref Range Status   06/05/2023 09:42  149 - 390 Thousands/uL Final     TSH 3RD GENERATON   Date/Time Value Ref Range Status   12/09/2021 12:19 PM 2.900 0.358 - 3.740 uIU/mL Final     Comment:     The recommended reference ranges for TSH during pregnancy are as follows:   First trimester 0.1 to 2.5 uIU/mL   Second trimester  0.2 to 3.0 uIU/mL   Third trimester 0.3 to 3.0 uIU/m    Note: Normal ranges may not apply to patients who are transgender, non-binary, or whose legal sex, sex at birth, and gender identity differ.      Free T4   Date/Time Value Ref Range Status   12/09/2021 12:19 PM 1.19 0.76 - 1.46 ng/dL Final     Comment:     Specimen collection should occur prior to Sulfasalazine administration due to the potential for falsely elevated results. Sodium   Date/Time Value Ref Range Status   06/05/2023 09:42  135 - 147 mmol/L Final     BUN   Date/Time Value Ref Range Status   06/05/2023 09:42 AM 12 5 - 25 mg/dL Final     Creatinine   Date/Time Value Ref Range Status   06/05/2023 09:42 AM 0.70 0.60 - 1.30 mg/dL Final     Comment:     Standardized to IDMS reference method      In chart    This note was created with voice recognition software. Phonic, grammatical and spelling errors may be present within the note as a result.

## 2023-09-25 ENCOUNTER — TELEPHONE (OUTPATIENT)
Dept: INTERNAL MEDICINE CLINIC | Facility: CLINIC | Age: 38
End: 2023-09-25

## 2023-09-25 NOTE — TELEPHONE ENCOUNTER
Noted; Thanks! Honestly, if she presents to the urgent care with those symptoms, they'll probably send her over to the ED anyway.

## 2023-09-25 NOTE — TELEPHONE ENCOUNTER
Patient called - has been experiencing heart palpitations, tightness in chest and back pain. I told her that Chelly Oneill is out today but that she should go the ER. She said should would go to Urgent Care.

## 2023-10-11 ENCOUNTER — TELEPHONE (OUTPATIENT)
Dept: INTERNAL MEDICINE CLINIC | Facility: CLINIC | Age: 38
End: 2023-10-11

## 2023-10-16 DIAGNOSIS — E11.65 UNCONTROLLED TYPE 2 DIABETES MELLITUS WITH HYPERGLYCEMIA (HCC): ICD-10-CM

## 2023-10-26 NOTE — TELEPHONE ENCOUNTER
10/26/23 1:28 PM        The office's request has been received, reviewed, and the patient chart updated. The PCP has successfully been removed with a patient attribution note. This message will now be completed.         Thank you  Sondra Candy

## 2024-03-05 ENCOUNTER — TELEPHONE (OUTPATIENT)
Dept: INTERNAL MEDICINE CLINIC | Facility: CLINIC | Age: 39
End: 2024-03-05

## 2024-03-05 NOTE — TELEPHONE ENCOUNTER
Jay wants patient to come into the office for an appointment to renew medication. Patient has a form to be completed (which is with the MA) for her medication.

## 2024-04-03 ENCOUNTER — TELEPHONE (OUTPATIENT)
Dept: INTERNAL MEDICINE CLINIC | Facility: CLINIC | Age: 39
End: 2024-04-03

## 2024-04-03 NOTE — TELEPHONE ENCOUNTER
Pt is not our patient anymore DEMOND Ricardo, this is Kacy Chen, October 29th 1978 [place] [phone]. That's my phone. I'm calling you regarding of you guys receiving paperwork from me. It turns out that Jay just needs to cancel my prescriptions on his end and everything will be good OK. Thank you. Bye.

## 2024-04-04 NOTE — TELEPHONE ENCOUNTER
Noted. Patient attribution was created already and we have ended our care as PCP. There are no forms in the bin for this patient.

## 2024-07-15 NOTE — PATIENT INSTRUCTIONS
Addended by: BOB PONCE on: 7/14/2024 09:18 PM     Modules accepted: Orders     Recurrent sore throat and ear congestion sounds more like allergies  Therefore start Claritin ( loratadine ) 10 mg 1 daily  For anxiety/depression symptoms we are going to increase your escitalopram to 10 mg daily  Follow-up as you had scheduled with the dietitian for your diabetes